# Patient Record
Sex: FEMALE | Race: WHITE | Employment: FULL TIME | URBAN - METROPOLITAN AREA
[De-identification: names, ages, dates, MRNs, and addresses within clinical notes are randomized per-mention and may not be internally consistent; named-entity substitution may affect disease eponyms.]

---

## 2017-10-04 ENCOUNTER — EMPLOYEE HEALTH (OUTPATIENT)
Dept: OCCUPATIONAL MEDICINE | Age: 42
End: 2017-10-04
Attending: PHYSICIAN ASSISTANT

## 2017-11-27 ENCOUNTER — OFFICE VISIT (OUTPATIENT)
Dept: FAMILY MEDICINE CLINIC | Facility: CLINIC | Age: 42
End: 2017-11-27

## 2017-11-27 ENCOUNTER — APPOINTMENT (OUTPATIENT)
Dept: LAB | Age: 42
End: 2017-11-27
Attending: PHYSICIAN ASSISTANT
Payer: COMMERCIAL

## 2017-11-27 VITALS
SYSTOLIC BLOOD PRESSURE: 102 MMHG | DIASTOLIC BLOOD PRESSURE: 70 MMHG | BODY MASS INDEX: 25.48 KG/M2 | HEIGHT: 58 IN | TEMPERATURE: 98 F | HEART RATE: 88 BPM | WEIGHT: 121.38 LBS | RESPIRATION RATE: 16 BRPM

## 2017-11-27 DIAGNOSIS — Z13.29 SCREENING FOR ENDOCRINE, METABOLIC AND IMMUNITY DISORDER: ICD-10-CM

## 2017-11-27 DIAGNOSIS — Z13.0 SCREENING FOR ENDOCRINE, METABOLIC AND IMMUNITY DISORDER: ICD-10-CM

## 2017-11-27 DIAGNOSIS — Z13.228 SCREENING FOR ENDOCRINE, METABOLIC AND IMMUNITY DISORDER: ICD-10-CM

## 2017-11-27 DIAGNOSIS — Z13.220 SCREENING FOR LIPOID DISORDERS: ICD-10-CM

## 2017-11-27 DIAGNOSIS — Z00.00 ROUTINE GENERAL MEDICAL EXAMINATION AT A HEALTH CARE FACILITY: Primary | ICD-10-CM

## 2017-11-27 PROCEDURE — 80061 LIPID PANEL: CPT

## 2017-11-27 PROCEDURE — 99386 PREV VISIT NEW AGE 40-64: CPT | Performed by: PHYSICIAN ASSISTANT

## 2017-11-27 PROCEDURE — 36415 COLL VENOUS BLD VENIPUNCTURE: CPT

## 2017-11-27 PROCEDURE — 80053 COMPREHEN METABOLIC PANEL: CPT

## 2017-11-27 RX ORDER — NORETHINDRONE ACETATE AND ETHINYL ESTRADIOL .03; 1.5 MG/1; MG/1
1 TABLET ORAL DAILY
COMMUNITY
End: 2020-09-01

## 2017-11-27 NOTE — PROGRESS NOTES
Simone Doyle is a 38year old female who presents for a complete physical exam/wellness screening. She has no complaints.   HPI:   Last colonoscopy:  None  Last PAP: Sees GYN  Last Mammogram: None; sees GYN  Last Tetanus:     Wt Readings from Last 6 Encounters: 102/70   Pulse 88   Temp 98.4 °F (36.9 °C) (Oral)   Resp 16   Ht 58\"   Wt 121 lb 6.4 oz   LMP  (LMP Unknown)   BMI 25.37 kg/m²   Body mass index is 25.37 kg/m². Vital signs reviewed.      General: Well developed, well nourished; in no apparent distress

## 2018-03-30 ENCOUNTER — HOSPITAL ENCOUNTER (OUTPATIENT)
Dept: MAMMOGRAPHY | Facility: HOSPITAL | Age: 43
Discharge: HOME | End: 2018-03-30
Attending: OBSTETRICS & GYNECOLOGY
Payer: COMMERCIAL

## 2018-03-30 DIAGNOSIS — Z12.31 ENCOUNTER FOR SCREENING MAMMOGRAM FOR MALIGNANT NEOPLASM OF BREAST: ICD-10-CM

## 2018-03-30 PROCEDURE — 77067 SCR MAMMO BI INCL CAD: CPT | Performed by: OBSTETRICS & GYNECOLOGY

## 2018-08-27 ENCOUNTER — OFFICE VISIT (OUTPATIENT)
Dept: FAMILY MEDICINE CLINIC | Facility: CLINIC | Age: 43
End: 2018-08-27
Payer: COMMERCIAL

## 2018-08-27 VITALS
WEIGHT: 121 LBS | TEMPERATURE: 98 F | HEIGHT: 58 IN | RESPIRATION RATE: 18 BRPM | BODY MASS INDEX: 25.4 KG/M2 | HEART RATE: 89 BPM | OXYGEN SATURATION: 99 % | DIASTOLIC BLOOD PRESSURE: 70 MMHG | SYSTOLIC BLOOD PRESSURE: 110 MMHG

## 2018-08-27 DIAGNOSIS — Z13.0 SCREENING FOR IRON DEFICIENCY ANEMIA: ICD-10-CM

## 2018-08-27 DIAGNOSIS — Z13.29 SCREENING FOR ENDOCRINE, NUTRITIONAL, METABOLIC AND IMMUNITY DISORDER: ICD-10-CM

## 2018-08-27 DIAGNOSIS — Z13.228 SCREENING FOR ENDOCRINE, NUTRITIONAL, METABOLIC AND IMMUNITY DISORDER: ICD-10-CM

## 2018-08-27 DIAGNOSIS — Z00.00 ANNUAL PHYSICAL EXAM: Primary | ICD-10-CM

## 2018-08-27 DIAGNOSIS — E78.1 HYPERTRIGLYCERIDEMIA: ICD-10-CM

## 2018-08-27 DIAGNOSIS — Z13.21 SCREENING FOR ENDOCRINE, NUTRITIONAL, METABOLIC AND IMMUNITY DISORDER: ICD-10-CM

## 2018-08-27 DIAGNOSIS — Z13.6 SCREENING FOR HEART DISEASE: ICD-10-CM

## 2018-08-27 DIAGNOSIS — Z13.0 SCREENING FOR ENDOCRINE, NUTRITIONAL, METABOLIC AND IMMUNITY DISORDER: ICD-10-CM

## 2018-08-27 LAB
ALBUMIN SERPL-MCNC: 3.9 G/DL (ref 3.5–4.8)
ALBUMIN/GLOB SERPL: 1.1 {RATIO} (ref 1–2)
ALP LIVER SERPL-CCNC: 46 U/L (ref 37–98)
ALT SERPL-CCNC: 20 U/L (ref 14–54)
ANION GAP SERPL CALC-SCNC: 9 MMOL/L (ref 0–18)
AST SERPL-CCNC: 20 U/L (ref 15–41)
BASOPHILS # BLD AUTO: 0.05 X10(3) UL (ref 0–0.1)
BASOPHILS NFR BLD AUTO: 0.8 %
BILIRUB SERPL-MCNC: 0.4 MG/DL (ref 0.1–2)
BUN BLD-MCNC: 11 MG/DL (ref 8–20)
BUN/CREAT SERPL: 12.1 (ref 10–20)
CALCIUM BLD-MCNC: 9.3 MG/DL (ref 8.3–10.3)
CHLORIDE SERPL-SCNC: 108 MMOL/L (ref 101–111)
CHOLEST SMN-MCNC: 199 MG/DL (ref ?–200)
CO2 SERPL-SCNC: 24 MMOL/L (ref 22–32)
CREAT BLD-MCNC: 0.91 MG/DL (ref 0.55–1.02)
EOSINOPHIL # BLD AUTO: 0.06 X10(3) UL (ref 0–0.3)
EOSINOPHIL NFR BLD AUTO: 0.9 %
ERYTHROCYTE [DISTWIDTH] IN BLOOD BY AUTOMATED COUNT: 12.1 % (ref 11.5–16)
GLOBULIN PLAS-MCNC: 3.6 G/DL (ref 2.5–4)
GLUCOSE BLD-MCNC: 87 MG/DL (ref 70–99)
HCT VFR BLD AUTO: 43.9 % (ref 34–50)
HDLC SERPL-MCNC: 43 MG/DL (ref 40–59)
HGB BLD-MCNC: 15.2 G/DL (ref 12–16)
IMMATURE GRANULOCYTE COUNT: 0.02 X10(3) UL (ref 0–1)
IMMATURE GRANULOCYTE RATIO %: 0.3 %
LDLC SERPL CALC-MCNC: 98 MG/DL (ref ?–100)
LYMPHOCYTES # BLD AUTO: 2.2 X10(3) UL (ref 0.9–4)
LYMPHOCYTES NFR BLD AUTO: 33.2 %
M PROTEIN MFR SERPL ELPH: 7.5 G/DL (ref 6.1–8.3)
MCH RBC QN AUTO: 31.3 PG (ref 27–33.2)
MCHC RBC AUTO-ENTMCNC: 34.6 G/DL (ref 31–37)
MCV RBC AUTO: 90.3 FL (ref 81–100)
MONOCYTES # BLD AUTO: 0.48 X10(3) UL (ref 0.1–1)
MONOCYTES NFR BLD AUTO: 7.2 %
NEUTROPHIL ABS PRELIM: 3.82 X10 (3) UL (ref 1.3–6.7)
NEUTROPHILS # BLD AUTO: 3.82 X10(3) UL (ref 1.3–6.7)
NEUTROPHILS NFR BLD AUTO: 57.6 %
NONHDLC SERPL-MCNC: 156 MG/DL (ref ?–130)
OSMOLALITY SERPL CALC.SUM OF ELEC: 291 MOSM/KG (ref 275–295)
PLATELET # BLD AUTO: 232 10(3)UL (ref 150–450)
POTASSIUM SERPL-SCNC: 3.8 MMOL/L (ref 3.6–5.1)
RBC # BLD AUTO: 4.86 X10(6)UL (ref 3.8–5.1)
RED CELL DISTRIBUTION WIDTH-SD: 40 FL (ref 35.1–46.3)
SODIUM SERPL-SCNC: 141 MMOL/L (ref 136–144)
TRIGL SERPL-MCNC: 292 MG/DL (ref 30–149)
TSI SER-ACNC: 2.7 MIU/ML (ref 0.35–5.5)
VLDLC SERPL CALC-MCNC: 58 MG/DL (ref 0–30)
WBC # BLD AUTO: 6.6 X10(3) UL (ref 4–13)

## 2018-08-27 PROCEDURE — 36415 COLL VENOUS BLD VENIPUNCTURE: CPT | Performed by: FAMILY MEDICINE

## 2018-08-27 PROCEDURE — 99386 PREV VISIT NEW AGE 40-64: CPT | Performed by: FAMILY MEDICINE

## 2018-08-27 PROCEDURE — 80050 GENERAL HEALTH PANEL: CPT | Performed by: FAMILY MEDICINE

## 2018-08-27 PROCEDURE — 80061 LIPID PANEL: CPT | Performed by: FAMILY MEDICINE

## 2018-08-27 NOTE — PROGRESS NOTES
REASON FOR VISIT:    Rory Estrada is a 41year old female who presents for an Annual Health Assessment. 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   - twins ages 15and 38year-old twins  menses: Regular-sees OB/GYN,  [facility] [address] Cut : No    Annoyed : No    Guilty : No    Eye Opener : No    Scoring  Total Score: 0     Depression Screening (PHQ-2/PHQ-9): Over the LAST 2 WEEKS   Little interest or pleasure in doing things (over the last two weeks)?: Not at all    Feeling down, depres Tuberculosis Screen if high risk No components found for: PPDINDURAT      Disease Monitoring:    SPECIFIC DISEASE MONITORING Internal Lab or Procedure External Lab or Procedure   Annual Monitoring of Persistent     Medications (ACE/ARB, digoxin, diuretics) Alcohol use: Yes           1.2 oz/week     Standard drinks or equivalent: 2 per week     Comment: 2 drinks a week    Occ: Works [employer] :        REVIEW OF SYSTEMS:   GENERAL: feels well otherwise  SKIN: denies any unusual skin lesions  EYES Massimo Cobian is a 41year old female who presents for an [address]. PLAN SUMMARY:   1. Annual physical exam  Healthy-weight/bmi normal  -Encourage healthy diet of whole food and avoid processed food and sugary drinks and sodas.   Die

## 2018-08-30 ENCOUNTER — TELEPHONE (OUTPATIENT)
Dept: FAMILY MEDICINE CLINIC | Facility: CLINIC | Age: 43
End: 2018-08-30

## 2018-08-30 NOTE — TELEPHONE ENCOUNTER
MADELINEOM to return call to the office.  Provided pt office phone [phone] along with office hours given.    ----- Message from Hector Angulo DO sent at 8/29/2018  3:54 PM CDT -----  Mychart notified:  Your labs are normal except your lipid panel is mildl

## 2018-09-12 ENCOUNTER — TELEPHONE (OUTPATIENT)
Dept: FAMILY MEDICINE CLINIC | Facility: CLINIC | Age: 43
End: 2018-09-12

## 2018-09-14 NOTE — TELEPHONE ENCOUNTER
Patient signed HIPAA medical records authorization form for the Facility identified below to disclose patient health information to Tae Mead / Provider Name: [id number] Address: [address] [place] [address]. [address].  [address]

## 2018-12-20 ENCOUNTER — OFFICE VISIT (OUTPATIENT)
Dept: FAMILY MEDICINE CLINIC | Facility: CLINIC | Age: 43
End: 2018-12-20
Payer: COMMERCIAL

## 2018-12-20 VITALS
RESPIRATION RATE: 22 BRPM | SYSTOLIC BLOOD PRESSURE: 108 MMHG | DIASTOLIC BLOOD PRESSURE: 70 MMHG | WEIGHT: 129.81 LBS | OXYGEN SATURATION: 99 % | BODY MASS INDEX: 27 KG/M2 | TEMPERATURE: 98 F | HEART RATE: 87 BPM

## 2018-12-20 DIAGNOSIS — R29.898 TRANSIENT RIGHT LEG WEAKNESS: ICD-10-CM

## 2018-12-20 DIAGNOSIS — J01.20 SUBACUTE ETHMOIDAL SINUSITIS: Primary | ICD-10-CM

## 2018-12-20 DIAGNOSIS — Z91.09 ENVIRONMENTAL ALLERGIES: ICD-10-CM

## 2018-12-20 DIAGNOSIS — E78.1 HYPERTRIGLYCERIDEMIA: ICD-10-CM

## 2018-12-20 DIAGNOSIS — R29.898 TRANSIENT WEAKNESS OF LEFT LEG: ICD-10-CM

## 2018-12-20 DIAGNOSIS — H65.21 RIGHT CHRONIC SEROUS OTITIS MEDIA: ICD-10-CM

## 2018-12-20 DIAGNOSIS — H05.821 EYE MUSCLE WEAKNESS, RIGHT: ICD-10-CM

## 2018-12-20 PROCEDURE — 99214 OFFICE O/P EST MOD 30 MIN: CPT | Performed by: FAMILY MEDICINE

## 2018-12-20 RX ORDER — METHYLPREDNISOLONE 4 MG/1
TABLET ORAL
Qty: 21 TABLET | Refills: 0 | Status: SHIPPED | OUTPATIENT
Start: 2018-12-20 | End: 2019-08-19 | Stop reason: ALTCHOICE

## 2018-12-20 RX ORDER — AZITHROMYCIN 250 MG/1
TABLET, FILM COATED ORAL
Qty: 6 TABLET | Refills: 0 | Status: SHIPPED | OUTPATIENT
Start: 2018-12-20 | End: 2019-08-19 | Stop reason: ALTCHOICE

## 2018-12-20 RX ORDER — FLUTICASONE PROPIONATE 50 MCG
1 SPRAY, SUSPENSION (ML) NASAL 2 TIMES DAILY
Qty: 1 BOTTLE | Refills: 3 | Status: SHIPPED | OUTPATIENT
Start: 2018-12-20 | End: 2019-10-16

## 2018-12-20 NOTE — PROGRESS NOTES
CHIEF COMPLAINT: Patient presents with:  Ear Problem: crackling; x1 week  Eye Problem: difficulty opening eyelid    HPI:     Elsa Marylou is a 41year old female presents for complains of for the past month ear crackling right greater than left, right Past Surgical History:   Procedure Laterality Date   • HERNIA SURGERY     • KNEE SURGERY      ACL repair   • PRIOR CLASSICAL   ;    • REMOVAL OF RADIAL STYLOID     • TONSILLECTOMY        Family History   Adopted: Yes   Fa nontender  Neck: supple. No adenopathy. Heart: RRR without S3 or S4 murmur. Clear S1S2  Lungs: clear to auscultation bilaterally. No rales, rhonchi or wheezes. Good inspiratory and expiratory effort. No costosternal retractions.   Abdomen: soft, nontender 08/27/2018 40.0    • Neutrophil Absolute Prel* 08/27/2018 3.82    • Neutrophil Absolute 08/27/2018 3.82    • Lymphocyte Absolute 08/27/2018 2.20    • Monocyte Absolute 08/27/2018 0.48    • Eosinophil Absolute 08/27/2018 0.06    • Basophil Absolute 08/27/20 4 MG Oral Tab; Dose as generic Medrol Dosepak Take as directed with food and water  Dispense: 21 tablet; Refill: 0    7.  Hypertriglyceridemia  Repeat labs in 3 mos  Working on dietary and lifestyle changes    Meds This Visit:  Requested Prescriptions     S

## 2018-12-20 NOTE — PATIENT INSTRUCTIONS
· Rinse nasal passages with Neti pot every morning.   Follow directions to positive properly clean  · Use Flonase daily and continue Claritin  · allergy home modifications discussed-shower nightly, keep windows closed, consider hepa filter for bedroom, keep likely for you to get sinusitis. Do your best to prevent sinusitis by preventing these problems. Do what you can to avoid getting colds and other infections. Stay away from things that cause allergies (allergens). Keep your sinuses as moist as you can.   Ti chemical smoke in workplace settings. · Use saltwater rinses. Date Last Reviewed: 10/1/2016  © 5065-2009 [employer]. [address] [facility], [address] [place] [address]. All rights reserved.  This information is not intended as a substitute for angelo medications to help relieve them. Date Last Reviewed: 10/1/2016  © 0441-9984 [employer] [address]. [facility]7 [facility] [place], [address] [place]. All rights reserved. This information is not intended as a substitute for professional medical care.  Always over-the-counter medicine as directed to control pain, unless another medicine was prescribed. If you have chronic liver or kidney disease or ever had a stomach ulcer or GI bleeding, talk with your doctor before using these medicines.  Aspirin should never

## 2018-12-26 ENCOUNTER — APPOINTMENT (OUTPATIENT)
Dept: CT IMAGING | Facility: HOSPITAL | Age: 43
End: 2018-12-26
Attending: EMERGENCY MEDICINE
Payer: COMMERCIAL

## 2018-12-26 ENCOUNTER — HOSPITAL ENCOUNTER (EMERGENCY)
Facility: HOSPITAL | Age: 43
Discharge: HOME | End: 2018-12-26
Attending: EMERGENCY MEDICINE
Payer: COMMERCIAL

## 2018-12-26 ENCOUNTER — APPOINTMENT (OUTPATIENT)
Dept: ULTRASOUND IMAGING | Facility: HOSPITAL | Age: 43
End: 2018-12-26
Attending: EMERGENCY MEDICINE
Payer: COMMERCIAL

## 2018-12-26 VITALS
OXYGEN SATURATION: 98 % | TEMPERATURE: 97 F | WEIGHT: 126 LBS | HEART RATE: 84 BPM | BODY MASS INDEX: 27.18 KG/M2 | SYSTOLIC BLOOD PRESSURE: 123 MMHG | DIASTOLIC BLOOD PRESSURE: 84 MMHG | RESPIRATION RATE: 16 BRPM | HEIGHT: 57 IN

## 2018-12-26 DIAGNOSIS — R10.13 ABDOMINAL PAIN, EPIGASTRIC: Primary | ICD-10-CM

## 2018-12-26 PROCEDURE — 99285 EMERGENCY DEPT VISIT HI MDM: CPT

## 2018-12-26 PROCEDURE — 96374 THER/PROPH/DIAG INJ IV PUSH: CPT

## 2018-12-26 PROCEDURE — 74176 CT ABD & PELVIS W/O CONTRAST: CPT | Performed by: EMERGENCY MEDICINE

## 2018-12-26 PROCEDURE — 76700 US EXAM ABDOM COMPLETE: CPT | Performed by: EMERGENCY MEDICINE

## 2018-12-26 PROCEDURE — 81025 URINE PREGNANCY TEST: CPT

## 2018-12-26 PROCEDURE — 80053 COMPREHEN METABOLIC PANEL: CPT | Performed by: EMERGENCY MEDICINE

## 2018-12-26 PROCEDURE — 99284 EMERGENCY DEPT VISIT MOD MDM: CPT

## 2018-12-26 PROCEDURE — 96361 HYDRATE IV INFUSION ADD-ON: CPT

## 2018-12-26 PROCEDURE — 83690 ASSAY OF LIPASE: CPT | Performed by: EMERGENCY MEDICINE

## 2018-12-26 PROCEDURE — 81001 URINALYSIS AUTO W/SCOPE: CPT | Performed by: EMERGENCY MEDICINE

## 2018-12-26 PROCEDURE — 85025 COMPLETE CBC W/AUTO DIFF WBC: CPT | Performed by: EMERGENCY MEDICINE

## 2018-12-26 RX ORDER — PANTOPRAZOLE SODIUM 40 MG/1
40 TABLET, DELAYED RELEASE ORAL DAILY
Qty: 21 TABLET | Refills: 0 | Status: SHIPPED | OUTPATIENT
Start: 2018-12-26 | End: 2020-09-01

## 2018-12-26 RX ORDER — SODIUM CHLORIDE 9 MG/ML
1000 INJECTION, SOLUTION INTRAVENOUS ONCE
Status: COMPLETED | OUTPATIENT
Start: 2018-12-26 | End: 2018-12-26

## 2018-12-26 RX ORDER — KETOROLAC TROMETHAMINE 30 MG/ML
30 INJECTION, SOLUTION INTRAMUSCULAR; INTRAVENOUS ONCE
Status: COMPLETED | OUTPATIENT
Start: 2018-12-26 | End: 2018-12-26

## 2018-12-26 RX ORDER — DICYCLOMINE HCL 20 MG
20 TABLET ORAL 4 TIMES DAILY PRN
Qty: 15 TABLET | Refills: 0 | Status: SHIPPED | OUTPATIENT
Start: 2018-12-26 | End: 2019-08-19 | Stop reason: ALTCHOICE

## 2018-12-26 NOTE — ED INITIAL ASSESSMENT (HPI)
Pt here with upper abd pain radiating to back x 2 days, +indigestion, denies vomiting or diarrhea, no fevers.

## 2018-12-26 NOTE — ED PROVIDER NOTES
Patient Seen in: [facility] Emergency Department    History   Patient presents with:  Abdomen/Flank Pain (GI/)  Back Pain (musculoskeletal)    Stated Complaint: abdominal pain-epigastric, back pain    HPI    The patient is a 53-year-old female who Resp 20   Ht 144.8 cm (4' 9\")   Wt 57.2 kg   SpO2 97%   BMI 27.27 kg/m²         Physical Exam  GENERAL: Well-developed, well-nourished female sitting up breathing easily in no apparent distress. Patient is nontoxic in appearance.   HEENT: Head is normoce following orders were created for panel order CBC WITH DIFFERENTIAL WITH PLATELET.   Procedure                               Abnormality         Status                     ---------                               -----------         ------ the emergency room stay. The patient states that she has had a nuclear medicine test done to look at her gallbladder within the last 10 years which is unremarkable.   Patient was instructed to go home and was given perception for Protonix and for Bentyl an

## 2019-01-02 ENCOUNTER — TELEPHONE (OUTPATIENT)
Dept: FAMILY MEDICINE CLINIC | Facility: CLINIC | Age: 44
End: 2019-01-02

## 2019-01-02 NOTE — TELEPHONE ENCOUNTER
LMOM to return call to the office. Provided pt office phone [phone] along with office hours given. Call FU ER epigastric pain 12/26/18- needs OV this week if pain continues. Please call.

## 2019-02-25 ENCOUNTER — APPOINTMENT (OUTPATIENT)
Dept: OTHER | Facility: HOSPITAL | Age: 44
End: 2019-02-25
Attending: PREVENTIVE MEDICINE
Payer: OTHER MISCELLANEOUS

## 2019-02-28 ENCOUNTER — APPOINTMENT (OUTPATIENT)
Dept: OTHER | Facility: HOSPITAL | Age: 44
End: 2019-02-28
Attending: PREVENTIVE MEDICINE
Payer: OTHER MISCELLANEOUS

## 2019-04-03 ENCOUNTER — TELEPHONE (OUTPATIENT)
Dept: OBGYN CLINIC | Facility: CLINIC | Age: 44
End: 2019-04-03

## 2019-04-03 NOTE — TELEPHONE ENCOUNTER
Received from [address] [place] [address] records. Put in  [employer] bin in [place]. Patient has appt in [facility] with Dr Prosper Samson on 5/1.

## 2019-08-19 ENCOUNTER — OFFICE VISIT (OUTPATIENT)
Dept: FAMILY MEDICINE CLINIC | Facility: CLINIC | Age: 44
End: 2019-08-19
Payer: COMMERCIAL

## 2019-08-19 VITALS
DIASTOLIC BLOOD PRESSURE: 66 MMHG | BODY MASS INDEX: 28.68 KG/M2 | RESPIRATION RATE: 18 BRPM | TEMPERATURE: 98 F | SYSTOLIC BLOOD PRESSURE: 112 MMHG | WEIGHT: 136.63 LBS | HEIGHT: 58 IN | HEART RATE: 88 BPM | OXYGEN SATURATION: 98 %

## 2019-08-19 DIAGNOSIS — Z13.21 SCREENING FOR ENDOCRINE, NUTRITIONAL, METABOLIC AND IMMUNITY DISORDER: ICD-10-CM

## 2019-08-19 DIAGNOSIS — Z13.0 SCREENING FOR ENDOCRINE, NUTRITIONAL, METABOLIC AND IMMUNITY DISORDER: ICD-10-CM

## 2019-08-19 DIAGNOSIS — Z13.0 SCREENING FOR IRON DEFICIENCY ANEMIA: ICD-10-CM

## 2019-08-19 DIAGNOSIS — Z12.39 SCREENING FOR MALIGNANT NEOPLASM OF BREAST: ICD-10-CM

## 2019-08-19 DIAGNOSIS — Z00.00 ANNUAL PHYSICAL EXAM: ICD-10-CM

## 2019-08-19 DIAGNOSIS — Z13.29 SCREENING FOR ENDOCRINE, NUTRITIONAL, METABOLIC AND IMMUNITY DISORDER: ICD-10-CM

## 2019-08-19 DIAGNOSIS — E78.1 HYPERTRIGLYCERIDEMIA: Primary | ICD-10-CM

## 2019-08-19 DIAGNOSIS — Z13.228 SCREENING FOR ENDOCRINE, NUTRITIONAL, METABOLIC AND IMMUNITY DISORDER: ICD-10-CM

## 2019-08-19 PROCEDURE — 99396 PREV VISIT EST AGE 40-64: CPT | Performed by: FAMILY MEDICINE

## 2019-08-19 RX ORDER — LORATADINE 10 MG/1
10 TABLET ORAL DAILY
COMMUNITY

## 2019-08-19 NOTE — PATIENT INSTRUCTIONS
As of October 6th 2014, the Drug Enforcement Agency [facility]) is reclassifying all hydrocodone combination medications from Schedule III to Schedule II. This includes medications such as Norco, Vicodin, Lortab, Zohydro, and Vicoprofen.      What this means for exam.    -Encourage healthy diet of whole food and avoid processed food and sugary drinks and sodas. Diet should include lean meats and vegetables including 5-7 servings of fruit and vegetables total in 1 day.   Never skip breakfast.  -Encouraged exercise effectively. Screening tests are not used to diagnose a disease. But they are used to see if more testing is needed. Health counseling is important, too. Below are guidelines for these, for women ages 36 to 52.  Talk with your healthcare provider to make burks colonoscopy. Screening advice varies among expert groups. Talk with your healthcare provider about which tests are best for you. Some people should be screened using a different schedule because of their personal or family health history.  Talk with your h months after the second dose and at least 4 months after the first dose   Haemophilus influenzae Type B (HIB) Women at increased risk 1 to 3 doses   Influenza (flu) All women in this age group Once a year   Measles, mumps, rubella (MMR) All women in this a a friend. When activity is fun, you're more likely to stick with it. You may wonder how you can improve the health of your heart. If you’re thinking about exercise, you’re on the right track.  You don’t need to become an athlete, but you do need a certain can do. You can also try swimming, riding a bike, dancing, or taking an exercise class.   Stop exercising and call your doctor if you:  · Have chest pain or feel dizzy or lightheaded  · Feel burning, tightness, pressure, or heaviness in your chest, neck, sh day (the amount in 1 teaspoon of salt), or less if your healthcare provider recommends it. Dining out less often and eating fewer processed foods are two great ways to decrease the amount of salt you consume. · Managing calories.  A calorie is a unit of en cholesterol, and sodium. Avoid products that contain trans fat. And pay close attention to serving size. For instance, if you plan to eat two servings, double all the numbers on the label.   Prepare food right  A key part of healthy cooking is cutting down keep your bones at their peak mass, be sure to exercise and get plenty of calcium. After menopause  Menopause is when a woman stops having monthly periods. After menopause, the body makes less estrogen (female hormone). This increases bone loss.  At this p fortified   215 mg/1 cup   Nonfat milk   302 mg/1 cup   Sedro Woolley, sockeye, canned, with bones   239 mg/3 oz. Tofu made with calcium sulfate   204 mg/3 oz.    Low-fat milk   297 mg/1 cup   Soybeans, fresh, boiled   131 mg/1/2 cup   Collards   179 mg/1/2 cup called 25-hydroxyvitamin D, also called 25(OH)D. Why do I need this test?  You may need this test if your healthcare provider wants to check your vitamin D levels to find out if you have any risks to bone health.  These might be:  · Low calcium  · Soft petey you're taking too much in supplement form. How is this test done? The test is done with a blood sample. A needle is used to draw blood from a vein in your arm or hand. Does this test pose any risks?   Having a blood test with a needle carries some risk time, thin bones may break. Women who aren't active are at a high risk for osteoporosis. · Certain medicines. Some medicines, such as cortisone, increase bone loss. They also decrease bone growth.  Ask your healthcare provider about any side effects of you exercise program.  · Use weights only as instructed. · Stop any exercise that causes pain. Date Last Reviewed: 5/1/2018  © 7626-1237 The [employer] [address]. 1407 [facility], [address] [place] [address]. All rights reserved.  This information is not inten

## 2019-08-19 NOTE — PROGRESS NOTES
REASON FOR VISIT:    Luda Amato is a 41year old female who presents for an Annual Health Assessment. 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     No results found for: GLUCOSE  Lab Results   Component Value Date    CHOLEST 199 08/27/2018    CHOLEST Pap Every 3 yrs age 21-65 or Pap and HPV every 5 yrs age 33-67 Pap Smear,2 Years due on 02/10/2020    Chlamydia Screening Screen Annually age<25, if sex active/on OCPs; >24 high risk No results found for: CHLAMYDIA    Colonoscopy Screen Every 10 years Ther HgbA1C  Annually No results found for: A1C No flowsheet data found. Creat/alb ratio  Annually Creatinine (mg/dL)   Date Value   12/26/2018 0.87        LDL  Annually LDL Cholesterol (mg/dL)   Date Value   08/27/2018 98    No flowsheet data found.      Oz Mao EYES: denies blurred vision or double vision  HEENT: denies nasal congestion, sinus pain or ST  LUNGS: denies shortness of breath with exertion  CARDIOVASCULAR: denies chest pain on exertion  GI: denies abdominal pain, denies heartburn  : denies dysuria, -Encourage healthy diet of whole food and avoid processed food and sugary drinks and sodas. Diet should include lean meats and vegetables including 5-7 servings of fruit and vegetables total in 1 day.   Never skip breakfast.  -Encouraged exercise 30 minute

## 2019-09-04 ENCOUNTER — APPOINTMENT (OUTPATIENT)
Dept: CT IMAGING | Age: 44
End: 2019-09-04
Attending: PHYSICIAN ASSISTANT
Payer: COMMERCIAL

## 2019-09-04 ENCOUNTER — HOSPITAL ENCOUNTER (OUTPATIENT)
Age: 44
Discharge: HOME | End: 2019-09-04
Payer: COMMERCIAL

## 2019-09-04 VITALS
BODY MASS INDEX: 29.12 KG/M2 | HEIGHT: 57 IN | WEIGHT: 135 LBS | TEMPERATURE: 98 F | OXYGEN SATURATION: 98 % | SYSTOLIC BLOOD PRESSURE: 120 MMHG | DIASTOLIC BLOOD PRESSURE: 80 MMHG | RESPIRATION RATE: 16 BRPM | HEART RATE: 79 BPM

## 2019-09-04 DIAGNOSIS — G43.009 MIGRAINE WITHOUT AURA AND WITHOUT STATUS MIGRAINOSUS, NOT INTRACTABLE: Primary | ICD-10-CM

## 2019-09-04 PROCEDURE — 96375 TX/PRO/DX INJ NEW DRUG ADDON: CPT

## 2019-09-04 PROCEDURE — 99215 OFFICE O/P EST HI 40 MIN: CPT

## 2019-09-04 PROCEDURE — 96374 THER/PROPH/DIAG INJ IV PUSH: CPT

## 2019-09-04 PROCEDURE — 70450 CT HEAD/BRAIN W/O DYE: CPT | Performed by: PHYSICIAN ASSISTANT

## 2019-09-04 PROCEDURE — 96361 HYDRATE IV INFUSION ADD-ON: CPT

## 2019-09-04 PROCEDURE — 99214 OFFICE O/P EST MOD 30 MIN: CPT

## 2019-09-04 RX ORDER — KETOROLAC TROMETHAMINE 30 MG/ML
30 INJECTION, SOLUTION INTRAMUSCULAR; INTRAVENOUS ONCE
Status: COMPLETED | OUTPATIENT
Start: 2019-09-04 | End: 2019-09-04

## 2019-09-04 RX ORDER — ACETAMINOPHEN 325 MG/1
650 TABLET ORAL ONCE
Status: COMPLETED | OUTPATIENT
Start: 2019-09-04 | End: 2019-09-04

## 2019-09-04 RX ORDER — METOCLOPRAMIDE HYDROCHLORIDE 5 MG/ML
10 INJECTION INTRAMUSCULAR; INTRAVENOUS ONCE
Status: COMPLETED | OUTPATIENT
Start: 2019-09-04 | End: 2019-09-04

## 2019-09-04 RX ORDER — SODIUM CHLORIDE 9 MG/ML
1000 INJECTION, SOLUTION INTRAVENOUS ONCE
Status: COMPLETED | OUTPATIENT
Start: 2019-09-04 | End: 2019-09-04

## 2019-09-04 NOTE — ED PROVIDER NOTES
Patient Seen in: [facility]    History   Patient presents with:  Headache (neurologic)    Stated Complaint: migraine     HPI    Jaclyncasie Sanford is a 44-year-old female with a history of migraines who states that over the past 48 hours s reviewed. All other systems reviewed and negative except as noted above.     Physical Exam     ED Triage Vitals [09/04/19 1019]   /78   Pulse 96   Resp 16   Temp 98.1 °F (36.7 °C)   Temp src    SpO2 98 %   O2 Device None (Room air)       Current: neurology follow-up. She verbalizes understanding she states that her and her physician talked about this at the last visit and she failed to schedule a appointment with neurology. Will patient was given an IV fluid bolus along with Toradol and Reglan. no

## 2019-09-04 NOTE — ED INITIAL ASSESSMENT (HPI)
developed right sided headache on Monday 10/10. Took usual excedrin migraine every 8 hrs without relief. Home from work yesterday, unable to tolerate work today.  has seasonal allergies.   Taking birth control for \"hormone migraines\"  Has

## 2019-09-06 ENCOUNTER — PATIENT OUTREACH (OUTPATIENT)
Dept: FAMILY MEDICINE CLINIC | Facility: CLINIC | Age: 44
End: 2019-09-06

## 2019-09-06 NOTE — PROGRESS NOTES
Patient was seen in the  IC on 9/4/19 for migraine. Left patient message to call office to schedule follow up appointment.

## 2019-10-04 ENCOUNTER — LAB ENCOUNTER (OUTPATIENT)
Dept: LAB | Facility: HOSPITAL | Age: 44
End: 2019-10-04
Attending: FAMILY MEDICINE
Payer: COMMERCIAL

## 2019-10-04 DIAGNOSIS — Z13.0 SCREENING FOR IRON DEFICIENCY ANEMIA: ICD-10-CM

## 2019-10-04 DIAGNOSIS — Z13.228 SCREENING FOR ENDOCRINE, NUTRITIONAL, METABOLIC AND IMMUNITY DISORDER: ICD-10-CM

## 2019-10-04 DIAGNOSIS — Z13.0 SCREENING FOR ENDOCRINE, NUTRITIONAL, METABOLIC AND IMMUNITY DISORDER: ICD-10-CM

## 2019-10-04 DIAGNOSIS — E78.1 HYPERTRIGLYCERIDEMIA: ICD-10-CM

## 2019-10-04 DIAGNOSIS — Z13.29 SCREENING FOR ENDOCRINE, NUTRITIONAL, METABOLIC AND IMMUNITY DISORDER: ICD-10-CM

## 2019-10-04 DIAGNOSIS — Z13.21 SCREENING FOR ENDOCRINE, NUTRITIONAL, METABOLIC AND IMMUNITY DISORDER: ICD-10-CM

## 2019-10-04 DIAGNOSIS — Z00.00 ANNUAL PHYSICAL EXAM: ICD-10-CM

## 2019-10-04 PROCEDURE — 80053 COMPREHEN METABOLIC PANEL: CPT

## 2019-10-04 PROCEDURE — 36415 COLL VENOUS BLD VENIPUNCTURE: CPT

## 2019-10-04 PROCEDURE — 84443 ASSAY THYROID STIM HORMONE: CPT

## 2019-10-04 PROCEDURE — 80061 LIPID PANEL: CPT

## 2019-10-04 PROCEDURE — 85025 COMPLETE CBC W/AUTO DIFF WBC: CPT

## 2019-10-08 ENCOUNTER — TELEPHONE (OUTPATIENT)
Dept: FAMILY MEDICINE CLINIC | Facility: CLINIC | Age: 44
End: 2019-10-08

## 2020-03-27 ENCOUNTER — MED REC SCAN ONLY (OUTPATIENT)
Dept: OBGYN CLINIC | Facility: CLINIC | Age: 45
End: 2020-03-27

## 2020-06-23 ENCOUNTER — OFFICE VISIT (OUTPATIENT)
Dept: ORTHOPEDICS CLINIC | Facility: CLINIC | Age: 45
End: 2020-06-23
Payer: COMMERCIAL

## 2020-06-23 ENCOUNTER — HOSPITAL ENCOUNTER (OUTPATIENT)
Dept: GENERAL RADIOLOGY | Facility: HOSPITAL | Age: 45
Discharge: HOME | End: 2020-06-23
Attending: ORTHOPAEDIC SURGERY
Payer: COMMERCIAL

## 2020-06-23 VITALS — HEART RATE: 84 BPM | SYSTOLIC BLOOD PRESSURE: 127 MMHG | DIASTOLIC BLOOD PRESSURE: 87 MMHG

## 2020-06-23 DIAGNOSIS — Z47.89 ORTHOPEDIC AFTERCARE: ICD-10-CM

## 2020-06-23 DIAGNOSIS — M77.11 RIGHT LATERAL EPICONDYLITIS: Primary | ICD-10-CM

## 2020-06-23 PROCEDURE — 20551 NJX 1 TENDON ORIGIN/INSJ: CPT | Performed by: ORTHOPAEDIC SURGERY

## 2020-06-23 PROCEDURE — 73070 X-RAY EXAM OF ELBOW: CPT | Performed by: ORTHOPAEDIC SURGERY

## 2020-06-23 PROCEDURE — 99203 OFFICE O/P NEW LOW 30 MIN: CPT | Performed by: ORTHOPAEDIC SURGERY

## 2020-06-23 RX ORDER — METHYLPREDNISOLONE ACETATE 40 MG/ML
40 INJECTION, SUSPENSION INTRA-ARTICULAR; INTRALESIONAL; INTRAMUSCULAR; SOFT TISSUE ONCE
Status: DISCONTINUED | OUTPATIENT
Start: 2020-06-23 | End: 2020-06-23

## 2020-06-23 RX ORDER — TRIAMCINOLONE ACETONIDE 40 MG/ML
40 INJECTION, SUSPENSION INTRA-ARTICULAR; INTRAMUSCULAR ONCE
Status: COMPLETED | OUTPATIENT
Start: 2020-06-23 | End: 2020-06-23

## 2020-06-23 NOTE — PROGRESS NOTES
Per verbal order from Dr. Jake Mujica, draw up 1ml of 1% lidocaine and 1ml of Kenalog 40 for cortisone injection to right elbow Clearance Monica LEDBETTER LPN    Pt left office before I could obtain post injection vitals.

## 2020-08-05 ENCOUNTER — PATIENT MESSAGE (OUTPATIENT)
Dept: FAMILY MEDICINE CLINIC | Facility: CLINIC | Age: 45
End: 2020-08-05

## 2020-08-05 NOTE — TELEPHONE ENCOUNTER
From: Erik Foster  To: Ju Fuentes DO  Sent: 2020 10:28 AM CDT  Subject: Non-Urgent Medical Question    I can't see my son on my chart. How do I get Sonia Schilling ( 10/28/2007) medicine (inhaler) form for school.  They normally send stuff as

## 2020-09-01 ENCOUNTER — OFFICE VISIT (OUTPATIENT)
Dept: OBGYN CLINIC | Facility: CLINIC | Age: 45
End: 2020-09-01
Payer: COMMERCIAL

## 2020-09-01 VITALS
HEIGHT: 58 IN | SYSTOLIC BLOOD PRESSURE: 110 MMHG | WEIGHT: 131.63 LBS | HEART RATE: 87 BPM | DIASTOLIC BLOOD PRESSURE: 70 MMHG | BODY MASS INDEX: 27.63 KG/M2

## 2020-09-01 DIAGNOSIS — N76.1 CHRONIC VAGINITIS: ICD-10-CM

## 2020-09-01 DIAGNOSIS — G43.829 MENSTRUAL MIGRAINE WITHOUT STATUS MIGRAINOSUS, NOT INTRACTABLE: ICD-10-CM

## 2020-09-01 DIAGNOSIS — Z11.3 SCREEN FOR STD (SEXUALLY TRANSMITTED DISEASE): ICD-10-CM

## 2020-09-01 DIAGNOSIS — Z12.31 ENCOUNTER FOR SCREENING MAMMOGRAM FOR MALIGNANT NEOPLASM OF BREAST: ICD-10-CM

## 2020-09-01 DIAGNOSIS — Z01.419 WELL WOMAN EXAM WITH ROUTINE GYNECOLOGICAL EXAM: Primary | ICD-10-CM

## 2020-09-01 DIAGNOSIS — Z30.09 GENERAL COUNSELING FOR PRESCRIPTION OF ORAL CONTRACEPTIVES: ICD-10-CM

## 2020-09-01 PROCEDURE — 87491 CHLMYD TRACH DNA AMP PROBE: CPT | Performed by: OBSTETRICS & GYNECOLOGY

## 2020-09-01 PROCEDURE — 87510 GARDNER VAG DNA DIR PROBE: CPT | Performed by: OBSTETRICS & GYNECOLOGY

## 2020-09-01 PROCEDURE — 87624 HPV HI-RISK TYP POOLED RSLT: CPT | Performed by: OBSTETRICS & GYNECOLOGY

## 2020-09-01 PROCEDURE — 3074F SYST BP LT 130 MM HG: CPT | Performed by: OBSTETRICS & GYNECOLOGY

## 2020-09-01 PROCEDURE — 3008F BODY MASS INDEX DOCD: CPT | Performed by: OBSTETRICS & GYNECOLOGY

## 2020-09-01 PROCEDURE — 99386 PREV VISIT NEW AGE 40-64: CPT | Performed by: OBSTETRICS & GYNECOLOGY

## 2020-09-01 PROCEDURE — 88175 CYTOPATH C/V AUTO FLUID REDO: CPT | Performed by: OBSTETRICS & GYNECOLOGY

## 2020-09-01 PROCEDURE — 87591 N.GONORRHOEAE DNA AMP PROB: CPT | Performed by: OBSTETRICS & GYNECOLOGY

## 2020-09-01 PROCEDURE — 87660 TRICHOMONAS VAGIN DIR PROBE: CPT | Performed by: OBSTETRICS & GYNECOLOGY

## 2020-09-01 PROCEDURE — 3078F DIAST BP <80 MM HG: CPT | Performed by: OBSTETRICS & GYNECOLOGY

## 2020-09-01 PROCEDURE — 87625 HPV TYPES 16 & 18 ONLY: CPT | Performed by: OBSTETRICS & GYNECOLOGY

## 2020-09-01 PROCEDURE — 87480 CANDIDA DNA DIR PROBE: CPT | Performed by: OBSTETRICS & GYNECOLOGY

## 2020-09-01 RX ORDER — RIMEGEPANT SULFATE 75 MG/75MG
75 TABLET, ORALLY DISINTEGRATING ORAL AS NEEDED
COMMUNITY
Start: 2020-08-15 | End: 2020-09-11

## 2020-09-01 NOTE — H&P
[facility]  Obstetrics and Gynecology   History & Physical  NEW    Chief complaint: Patient presents with:  Wellness Visit: on birth control, wants to discuss other options   Other: continuous OCP to prevent migraines.  Having vaginal dryness & is Jennifer Armenta DO    Review of Systems   Constitutional: Negative. Respiratory: Negative. Cardiovascular: Negative. Gastrointestinal: Negative. Genitourinary: Positive for sexual dysfunction.  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        Bike Helmet: Not Asked        Seat Belt: Yes        Self-Exams: No    Social History Narrative      Not on file       Family History:  Family History   Adopted: Yes   Family history unknown: Yes       Objective:      09/01/20  1046 10/04/2019    MCH 31.8 10/04/2019    MCHC 36.3 10/04/2019    RDW 11.9 10/04/2019    .0 10/04/2019        Lab Results   Component Value Date    GLU 80 10/04/2019    BUN 11 10/04/2019    BUNCREA 12.4 10/04/2019    CREATSERUM 0.89 10/04/2019    ANIONGA Future    Screen for STD (sexually transmitted disease)  -     VAGINITIS/VAGINOSIS, DNA PROBE; Future  -     CHLAMYDIA/GONOCOCCUS, LUCAS; Future    Menstrual migraine without status migrainosus, not intractable  -     Drospirenone (SLYND) 4 MG Oral Tab;  Take

## 2020-09-03 LAB
C TRACH DNA SPEC QL NAA+PROBE: NEGATIVE
HPV I/H RISK 1 DNA SPEC QL NAA+PROBE: POSITIVE
HPV16 DNA CVX QL PROBE+SIG AMP: NEGATIVE
HPV18 DNA CVX QL PROBE+SIG AMP: NEGATIVE
N GONORRHOEA DNA SPEC QL NAA+PROBE: NEGATIVE

## 2020-09-03 NOTE — PROGRESS NOTES
Please notify patient of negative (normal) gonorrhea & chlamydia test. Positive for HPV. Await strain typing & pap.

## 2020-09-11 ENCOUNTER — OFFICE VISIT (OUTPATIENT)
Dept: FAMILY MEDICINE CLINIC | Facility: CLINIC | Age: 45
End: 2020-09-11
Payer: COMMERCIAL

## 2020-09-11 VITALS
TEMPERATURE: 98 F | DIASTOLIC BLOOD PRESSURE: 66 MMHG | HEART RATE: 108 BPM | SYSTOLIC BLOOD PRESSURE: 100 MMHG | RESPIRATION RATE: 18 BRPM | OXYGEN SATURATION: 98 % | WEIGHT: 127.63 LBS | HEIGHT: 57.75 IN | BODY MASS INDEX: 26.79 KG/M2

## 2020-09-11 DIAGNOSIS — E78.2 MIXED HYPERLIPIDEMIA: ICD-10-CM

## 2020-09-11 DIAGNOSIS — R87.610 ASCUS OF CERVIX WITH NEGATIVE HIGH RISK HPV: ICD-10-CM

## 2020-09-11 DIAGNOSIS — G43.809 OTHER MIGRAINE WITHOUT STATUS MIGRAINOSUS, NOT INTRACTABLE: ICD-10-CM

## 2020-09-11 DIAGNOSIS — Z00.00 ANNUAL PHYSICAL EXAM: Primary | ICD-10-CM

## 2020-09-11 PROBLEM — J01.20 SUBACUTE ETHMOIDAL SINUSITIS: Status: RESOLVED | Noted: 2018-12-20 | Resolved: 2020-09-11

## 2020-09-11 PROBLEM — E78.1 HYPERTRIGLYCERIDEMIA: Status: RESOLVED | Noted: 2018-08-27 | Resolved: 2020-09-11

## 2020-09-11 LAB
ALBUMIN SERPL-MCNC: 4.6 G/DL (ref 3.4–5)
ALBUMIN/GLOB SERPL: 1.5 {RATIO} (ref 1–2)
ALP LIVER SERPL-CCNC: 55 U/L (ref 37–98)
ALT SERPL-CCNC: 21 U/L (ref 13–56)
ANION GAP SERPL CALC-SCNC: 4 MMOL/L (ref 0–18)
AST SERPL-CCNC: 20 U/L (ref 15–37)
BASOPHILS # BLD AUTO: 0.06 X10(3) UL (ref 0–0.2)
BASOPHILS NFR BLD AUTO: 1.1 %
BILIRUB SERPL-MCNC: 0.6 MG/DL (ref 0.1–2)
BUN BLD-MCNC: 19 MG/DL (ref 7–18)
BUN/CREAT SERPL: 17.9 (ref 10–20)
CALCIUM BLD-MCNC: 9.8 MG/DL (ref 8.5–10.1)
CHLORIDE SERPL-SCNC: 106 MMOL/L (ref 98–112)
CHOLEST SMN-MCNC: 191 MG/DL (ref ?–200)
CO2 SERPL-SCNC: 28 MMOL/L (ref 21–32)
CREAT BLD-MCNC: 1.06 MG/DL (ref 0.55–1.02)
DEPRECATED RDW RBC AUTO: 38.2 FL (ref 35.1–46.3)
EOSINOPHIL # BLD AUTO: 0.09 X10(3) UL (ref 0–0.7)
EOSINOPHIL NFR BLD AUTO: 1.6 %
ERYTHROCYTE [DISTWIDTH] IN BLOOD BY AUTOMATED COUNT: 11.5 % (ref 11–15)
EST. AVERAGE GLUCOSE BLD GHB EST-MCNC: 88 MG/DL (ref 68–126)
GLOBULIN PLAS-MCNC: 3 G/DL (ref 2.8–4.4)
GLUCOSE BLD-MCNC: 93 MG/DL (ref 70–99)
HBA1C MFR BLD HPLC: 4.7 % (ref ?–5.7)
HCT VFR BLD AUTO: 44.6 % (ref 35–48)
HDLC SERPL-MCNC: 46 MG/DL (ref 40–59)
HGB BLD-MCNC: 15.6 G/DL (ref 12–16)
IMM GRANULOCYTES # BLD AUTO: 0.01 X10(3) UL (ref 0–1)
IMM GRANULOCYTES NFR BLD: 0.2 %
LDLC SERPL CALC-MCNC: 115 MG/DL (ref ?–100)
LYMPHOCYTES # BLD AUTO: 2.2 X10(3) UL (ref 1–4)
LYMPHOCYTES NFR BLD AUTO: 39.1 %
M PROTEIN MFR SERPL ELPH: 7.6 G/DL (ref 6.4–8.2)
MCH RBC QN AUTO: 31.7 PG (ref 26–34)
MCHC RBC AUTO-ENTMCNC: 35 G/DL (ref 31–37)
MCV RBC AUTO: 90.7 FL (ref 80–100)
MONOCYTES # BLD AUTO: 0.55 X10(3) UL (ref 0.1–1)
MONOCYTES NFR BLD AUTO: 9.8 %
NEUTROPHILS # BLD AUTO: 2.72 X10 (3) UL (ref 1.5–7.7)
NEUTROPHILS # BLD AUTO: 2.72 X10(3) UL (ref 1.5–7.7)
NEUTROPHILS NFR BLD AUTO: 48.2 %
NONHDLC SERPL-MCNC: 145 MG/DL (ref ?–130)
OSMOLALITY SERPL CALC.SUM OF ELEC: 288 MOSM/KG (ref 275–295)
PATIENT FASTING Y/N/NP: YES
PATIENT FASTING Y/N/NP: YES
PLATELET # BLD AUTO: 207 10(3)UL (ref 150–450)
POTASSIUM SERPL-SCNC: 4.4 MMOL/L (ref 3.5–5.1)
RBC # BLD AUTO: 4.92 X10(6)UL (ref 3.8–5.3)
SODIUM SERPL-SCNC: 138 MMOL/L (ref 136–145)
TRIGL SERPL-MCNC: 149 MG/DL (ref 30–149)
TSI SER-ACNC: 1.81 MIU/ML (ref 0.36–3.74)
VLDLC SERPL CALC-MCNC: 30 MG/DL (ref 0–30)
WBC # BLD AUTO: 5.6 X10(3) UL (ref 4–11)

## 2020-09-11 PROCEDURE — 3078F DIAST BP <80 MM HG: CPT | Performed by: FAMILY MEDICINE

## 2020-09-11 PROCEDURE — 83036 HEMOGLOBIN GLYCOSYLATED A1C: CPT | Performed by: FAMILY MEDICINE

## 2020-09-11 PROCEDURE — 85025 COMPLETE CBC W/AUTO DIFF WBC: CPT | Performed by: FAMILY MEDICINE

## 2020-09-11 PROCEDURE — 3008F BODY MASS INDEX DOCD: CPT | Performed by: FAMILY MEDICINE

## 2020-09-11 PROCEDURE — 84443 ASSAY THYROID STIM HORMONE: CPT | Performed by: FAMILY MEDICINE

## 2020-09-11 PROCEDURE — 36415 COLL VENOUS BLD VENIPUNCTURE: CPT | Performed by: FAMILY MEDICINE

## 2020-09-11 PROCEDURE — 3074F SYST BP LT 130 MM HG: CPT | Performed by: FAMILY MEDICINE

## 2020-09-11 PROCEDURE — 99396 PREV VISIT EST AGE 40-64: CPT | Performed by: FAMILY MEDICINE

## 2020-09-11 PROCEDURE — 80061 LIPID PANEL: CPT | Performed by: FAMILY MEDICINE

## 2020-09-11 PROCEDURE — 80053 COMPREHEN METABOLIC PANEL: CPT | Performed by: FAMILY MEDICINE

## 2020-09-11 RX ORDER — RIMEGEPANT SULFATE 75 MG/75MG
75 TABLET, ORALLY DISINTEGRATING ORAL AS NEEDED
Qty: 9 TABLET | Refills: 3 | Status: SHIPPED | OUTPATIENT
Start: 2020-09-11 | End: 2021-02-18

## 2020-09-11 NOTE — PATIENT INSTRUCTIONS
As of October 6th 2014, the Drug Enforcement Agency [facility]) is reclassifying all hydrocodone combination medications from Schedule III to Schedule II. This includes medications such as Norco, Vicodin, Lortab, Zohydro, and Vicoprofen.      What this means for to exam.    -Encourage healthy diet of whole food and avoid processed food and sugary drinks and sodas. Diet should include lean meats and vegetables including 5-7 servings of fruit and vegetables total in 1 day.   Never skip breakfast.  -Encouraged exerci exercise? Exercising regularly offers many healthy rewards.  It can help you do all of the following:  · Improve your blood cholesterol level to help prevent further heart trouble  · Lower your blood pressure to help prevent a stroke or heart attack  · Con substitute for professional medical care. Always follow your healthcare professional's instructions. Eating Heart-Healthy Foods  Eating has a big impact on your heart health. In fact, eating healthier can improve several of your heart risks at once.  Gagan Johnson keep a diary to record what you eat and how often you exercise. Choose the right foods  Aim to make these foods staples of your diet.  If you have diabetes, you may have different recommendations than what is listed here:  · Fruits and vegetables provide p spice up your food without adding calories, fat, or sodium. Try these items: horseradish, hot sauce, lemon, mustard, nonfat salad dressings, and vinegar. For salt-free herbs and spices, try basil, cilantro, cinnamon, pepper, and rosemary.   Date Last Review increased risk for fractures. With age, the quality and quantity of bone declines. You can lessen bone loss by staying active and increasing your calcium intake. Calcium supplements and other osteoporosis treatments do have risks.  So talk with your healthc may vary depending on brand and size.   Daily calcium needs  15to 25years old: 1,300 mg  23to 27years old: 1,000 mg  32to 48years old: 1,000 mg  46to 79years old, women: 1,200 mg  46to 79years old, men: 1,000 mg  Pregnant or nursin to 18 year disease  · Have dark skin pigmentation  · Being a  baby  Vitamin D has many effects in the body.  You may need this test to help your healthcare provider diagnose or treat:  · Problems with the parathyroid gland  · Cancer  · Autoimmune diseases, burks vitamin D in supplement form can affect your vitamin D levels. Ask your healthcare provider if any health conditions you have or medicines you take could affect your results.   How do I get ready for this test?  Tell your healthcare provider if you take vit [employer]. [address] [facility] [place], [address] [place] [address]. All rights reserved. This information is not intended as a substitute for professional medical care. Always follow your healthcare professional's instructions.           Living with Tony Kidd

## 2020-09-11 NOTE — PROGRESS NOTES
REASON FOR VISIT:    Leeann Valiente is a 39year old female who presents for an [address]. 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   - twins ages 15 and tablet 3   • Fluticasone Propionate 50 MCG/ACT Nasal Suspension 1 spray by Nasal route 2 (two) times daily. 1 Bottle 3   • loratadine 10 MG Oral Tab Take 10 mg by mouth daily.        Wt Readings from Last 6 Encounters:  09/11/20 : 127 lb 9.6 oz (57.9 kg)  0 Screening (PHQ-2/PHQ-9): Over the LAST 2 WEEKS   Little interest or pleasure in doing things (over the last two weeks)?: Not at all    Feeling down, depressed, or hopeless (over the last two weeks)?: Not at all    PHQ-2 SCORE: 0        PREVENTATIVE SERVICE Procedure External Lab or Procedure   Annual Monitoring of Persistent     Medications (ACE/ARB, digoxin, diuretics)    Potassium  Annually Potassium (mmol/L)   Date Value   10/04/2019 4.2    No flowsheet data found.     Creatinine  Annually Creatinine (mg/d hypoglycemic drug 2009    Glyburide   • Headache following lumbar puncture 3/2/2013   • History of tobacco use    • Hypertriglyceridemia 8/27/2018   • Infertility management     IVF to conceive twins - male factor - retrograde ejaculation   • Kidney stones since quitting: 3.6      Smokeless tobacco: Never Used    Alcohol use:  Yes      Alcohol/week: 2.0 standard drinks      Types: 2 Standard drinks or equivalent per week      Comment: 2 drinks a week    Drug use: No    Occ: Works [employer] :  intact    ASSESSMENT AND OTHER RELEVANT CHRONIC CONDITIONS:   Erik Foster is a 39year old female who presents for an [address]. PLAN SUMMARY:   1.  Annual physical exam  Healthy-weight/bmi normal  -Encourage healthy diet of whole f physical.    Diet counseling perfomed  Exercise counseling perfomed    SUGGESTED VACCINATIONS - Influenza, Pneumococcal, Zoster, Tetanus     Immunization History   Administered Date(s) Administered   • Fluvirin, 3 Years & >, Im 10/02/2013, 10/08/2014   • I

## 2020-09-11 NOTE — PROGRESS NOTES
Patient came in for draw of ordered fasting labs. Patient drawn out of left AC, x 1 attempt and tolerated well. 1 lt grn  tube drawn. Patient came in for draw of ordered fasting labs. Patient drawn out of right AC, x 2 attempt and tolerated well.   l

## 2020-09-13 ENCOUNTER — PATIENT MESSAGE (OUTPATIENT)
Dept: FAMILY MEDICINE CLINIC | Facility: CLINIC | Age: 45
End: 2020-09-13

## 2020-09-13 PROBLEM — IMO0002 INCREASE IN CREATININE: Status: ACTIVE | Noted: 2020-09-13

## 2020-09-13 PROBLEM — R79.89 INCREASE IN CREATININE: Status: ACTIVE | Noted: 2020-09-13

## 2020-09-14 NOTE — TELEPHONE ENCOUNTER
From: Yulissa Flores  To: Cristopher Bingham DO  Sent: 9/13/2020 12:31 PM CDT  Subject: Visit Follow-up Question    I see some labs are back, does the lipid panel take longer? Just making sure it was ordered.      Chente Ruff

## 2020-09-18 ENCOUNTER — TELEPHONE (OUTPATIENT)
Dept: FAMILY MEDICINE CLINIC | Facility: CLINIC | Age: 45
End: 2020-09-18

## 2020-09-18 NOTE — TELEPHONE ENCOUNTER
Health provider screening form completed and signed by PCP  Faxed to [facility] driven , Conf rec'd  [facility]

## 2020-09-20 ENCOUNTER — PATIENT MESSAGE (OUTPATIENT)
Dept: FAMILY MEDICINE CLINIC | Facility: CLINIC | Age: 45
End: 2020-09-20

## 2020-09-21 ENCOUNTER — HOSPITAL ENCOUNTER (OUTPATIENT)
Dept: MAMMOGRAPHY | Facility: HOSPITAL | Age: 45
Discharge: HOME | End: 2020-09-21
Attending: OBSTETRICS & GYNECOLOGY
Payer: COMMERCIAL

## 2020-09-21 DIAGNOSIS — Z12.31 ENCOUNTER FOR SCREENING MAMMOGRAM FOR MALIGNANT NEOPLASM OF BREAST: ICD-10-CM

## 2020-09-21 PROCEDURE — 77063 BREAST TOMOSYNTHESIS BI: CPT | Performed by: OBSTETRICS & GYNECOLOGY

## 2020-09-21 PROCEDURE — 77067 SCR MAMMO BI INCL CAD: CPT | Performed by: OBSTETRICS & GYNECOLOGY

## 2020-09-21 RX ORDER — RIMEGEPANT SULFATE 75 MG/75MG
75 TABLET, ORALLY DISINTEGRATING ORAL AS NEEDED
Qty: 9 TABLET | Refills: 3 | Status: CANCELLED | OUTPATIENT
Start: 2020-09-21

## 2020-09-21 NOTE — TELEPHONE ENCOUNTER
From: Conchita Baca  To: Dell Andrea DO  Sent: 9/20/2020 7:52 PM CDT  Subject: Prescription Question    I was getting my migraine meds through [facility]. She said she could send the rx. My pharmacy says they don't have it. Can you look into this for me.

## 2020-09-22 ENCOUNTER — TELEPHONE (OUTPATIENT)
Dept: FAMILY MEDICINE CLINIC | Facility: CLINIC | Age: 45
End: 2020-09-22

## 2020-09-22 NOTE — TELEPHONE ENCOUNTER
Pt called office stating she is completely out of her Migraine medication. And she needs a refill ASAP sent to her pharmacy. Pt states that she is at work and is difficult for her to answer her phone at this time.

## 2020-09-22 NOTE — TELEPHONE ENCOUNTER
Patient notified refill was sent to pharm on 9/11/220  Confirmed with pharmacy and they do have refill

## 2020-09-29 ENCOUNTER — TELEPHONE (OUTPATIENT)
Dept: OBGYN CLINIC | Facility: CLINIC | Age: 45
End: 2020-09-29

## 2020-09-29 NOTE — TELEPHONE ENCOUNTER
Spoke with patient. Routed pharmacies message to Dr Moon  for alternative recommendation for [facility]. Patient aware and understanding verbalized.

## 2020-09-29 NOTE — TELEPHONE ENCOUNTER
Spoke with patient and gave her all DR Mcnair recommendations. She had the coupon code for slynd but forgot to use it. I will give her 3 samples to get her to her appointment that is already scheduled.  She would like to discuss microgestin vs slynd aga

## 2020-09-29 NOTE — TELEPHONE ENCOUNTER
Pt just called and was told that to get her slynd medication she has to pay $160.00 and she does not want to pay that much. . She would like to talk to you about options and what is the best thing to do next. Please advise and call pt.  Thanks

## 2020-10-05 PROBLEM — R87.810 ASCUS WITH POSITIVE HIGH RISK HPV CERVICAL: Status: ACTIVE | Noted: 2020-09-01

## 2020-10-05 PROBLEM — R87.610 ASCUS WITH POSITIVE HIGH RISK HPV CERVICAL: Status: ACTIVE | Noted: 2020-09-01

## 2020-10-06 ENCOUNTER — OFFICE VISIT (OUTPATIENT)
Dept: OBGYN CLINIC | Facility: CLINIC | Age: 45
End: 2020-10-06
Payer: COMMERCIAL

## 2020-10-06 VITALS
BODY MASS INDEX: 27.29 KG/M2 | HEIGHT: 57.75 IN | WEIGHT: 130 LBS | DIASTOLIC BLOOD PRESSURE: 62 MMHG | SYSTOLIC BLOOD PRESSURE: 102 MMHG

## 2020-10-06 DIAGNOSIS — R87.810 ASCUS WITH POSITIVE HIGH RISK HPV CERVICAL: Primary | ICD-10-CM

## 2020-10-06 DIAGNOSIS — Z01.812 PRE-PROCEDURAL LABORATORY EXAMINATION: ICD-10-CM

## 2020-10-06 DIAGNOSIS — R87.610 ASCUS WITH POSITIVE HIGH RISK HPV CERVICAL: Primary | ICD-10-CM

## 2020-10-06 DIAGNOSIS — N76.0 VAGINITIS AND VULVOVAGINITIS: ICD-10-CM

## 2020-10-06 PROCEDURE — 57454 BX/CURETT OF CERVIX W/SCOPE: CPT | Performed by: OBSTETRICS & GYNECOLOGY

## 2020-10-06 PROCEDURE — 81025 URINE PREGNANCY TEST: CPT | Performed by: OBSTETRICS & GYNECOLOGY

## 2020-10-06 PROCEDURE — 3008F BODY MASS INDEX DOCD: CPT | Performed by: OBSTETRICS & GYNECOLOGY

## 2020-10-06 PROCEDURE — 3078F DIAST BP <80 MM HG: CPT | Performed by: OBSTETRICS & GYNECOLOGY

## 2020-10-06 PROCEDURE — 3074F SYST BP LT 130 MM HG: CPT | Performed by: OBSTETRICS & GYNECOLOGY

## 2020-10-06 PROCEDURE — 87660 TRICHOMONAS VAGIN DIR PROBE: CPT | Performed by: OBSTETRICS & GYNECOLOGY

## 2020-10-06 PROCEDURE — 87510 GARDNER VAG DNA DIR PROBE: CPT | Performed by: OBSTETRICS & GYNECOLOGY

## 2020-10-06 PROCEDURE — 87480 CANDIDA DNA DIR PROBE: CPT | Performed by: OBSTETRICS & GYNECOLOGY

## 2020-10-06 PROCEDURE — 88305 TISSUE EXAM BY PATHOLOGIST: CPT | Performed by: OBSTETRICS & GYNECOLOGY

## 2020-10-06 NOTE — PROCEDURES
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 Recently started progestin only OCP Slynd.  Recently noticed some external irritation with wiping on the in

## 2020-10-09 NOTE — PROGRESS NOTES
Patient aware of normal results & MD recommendations: Vaginitis swab negative for yeast infection. Patient was given Rx Terazol 7. If she wishes to try that, she can.  Otherwise we could consider vaginal hyaluronic acid (non-hormonal vaginal moisturizer), R

## 2020-10-10 NOTE — PROGRESS NOTES
Contacted patient. She reports that she received results via Snapt. Questions answered. Patient states understanding.     Copy to pap pool   updated to 1 year

## 2020-11-12 ENCOUNTER — LAB ENCOUNTER (OUTPATIENT)
Dept: LAB | Age: 45
End: 2020-11-12
Attending: PREVENTIVE MEDICINE
Payer: COMMERCIAL

## 2020-11-12 ENCOUNTER — TELEPHONE (OUTPATIENT)
Dept: INTERNAL MEDICINE CLINIC | Facility: HOSPITAL | Age: 45
End: 2020-11-12

## 2020-11-12 ENCOUNTER — TELEMEDICINE (OUTPATIENT)
Dept: FAMILY MEDICINE CLINIC | Facility: CLINIC | Age: 45
End: 2020-11-12
Payer: COMMERCIAL

## 2020-11-12 DIAGNOSIS — J01.10 ACUTE FRONTAL SINUSITIS, RECURRENCE NOT SPECIFIED: Primary | ICD-10-CM

## 2020-11-12 DIAGNOSIS — Z20.822 SUSPECTED 2019 NOVEL CORONAVIRUS INFECTION: Primary | ICD-10-CM

## 2020-11-12 DIAGNOSIS — Z20.822 SUSPECTED 2019 NOVEL CORONAVIRUS INFECTION: ICD-10-CM

## 2020-11-12 PROCEDURE — 99214 OFFICE O/P EST MOD 30 MIN: CPT | Performed by: FAMILY MEDICINE

## 2020-11-12 RX ORDER — AMOXICILLIN AND CLAVULANATE POTASSIUM 875; 125 MG/1; MG/1
1 TABLET, FILM COATED ORAL 2 TIMES DAILY
Qty: 14 TABLET | Refills: 0 | Status: SHIPPED | OUTPATIENT
Start: 2020-11-12 | End: 2020-11-19

## 2020-11-12 NOTE — PROGRESS NOTES
Video Visit    This visit is conducted using Telemedicine with live, interactive video and audio. Leeann Valiente  verbally consents to a Video visit.     Patient understands and accepts financial responsibility for any deductible, co-insurance and/or

## 2020-12-07 ENCOUNTER — TELEPHONE (OUTPATIENT)
Dept: INTERNAL MEDICINE CLINIC | Facility: HOSPITAL | Age: 45
End: 2020-12-07

## 2020-12-07 ENCOUNTER — NURSE ONLY (OUTPATIENT)
Dept: LAB | Facility: HOSPITAL | Age: 45
End: 2020-12-07
Attending: PREVENTIVE MEDICINE
Payer: COMMERCIAL

## 2020-12-07 DIAGNOSIS — Z20.822 SUSPECTED 2019 NOVEL CORONAVIRUS INFECTION: ICD-10-CM

## 2020-12-07 DIAGNOSIS — Z20.822 SUSPECTED 2019 NOVEL CORONAVIRUS INFECTION: Primary | ICD-10-CM

## 2020-12-07 PROCEDURE — 87426 SARSCOV CORONAVIRUS AG IA: CPT

## 2020-12-07 NOTE — TELEPHONE ENCOUNTER
Results and RTW guidelines:    COVID RESULT GIVEN:      Test type:    [x] Rapid  antigen       []       [x] NEGATIVE     Ordered  retest?  []Yes   [x] No (skip to RTW)        Notes: reports all sx are mild; has mild HA, cough, and body aches;

## 2020-12-07 NOTE — TELEPHONE ENCOUNTER
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 [] Yes    [x] No   If yes, explain:       NOTES: Sage Alpesh called c/o the above s/s that have worsen over the last couple of days. PLAN:   [x]No Known Exposure :  [x] Symptomatic: Test w/ Rapid now.  Stay home until further instruc

## 2020-12-21 ENCOUNTER — IMMUNIZATION (OUTPATIENT)
Dept: LAB | Facility: HOSPITAL | Age: 45
End: 2020-12-21
Attending: PREVENTIVE MEDICINE
Payer: COMMERCIAL

## 2020-12-21 ENCOUNTER — PATIENT MESSAGE (OUTPATIENT)
Dept: OBGYN CLINIC | Facility: CLINIC | Age: 45
End: 2020-12-21

## 2020-12-21 DIAGNOSIS — J01.20 SUBACUTE ETHMOIDAL SINUSITIS: ICD-10-CM

## 2020-12-21 DIAGNOSIS — Z23 NEED FOR VACCINATION: ICD-10-CM

## 2020-12-21 PROCEDURE — 0001A PFIZER-BIONTECH COVID-19 VACCINE: CPT

## 2020-12-22 RX ORDER — FLUTICASONE PROPIONATE 50 MCG
SPRAY, SUSPENSION (ML) NASAL
Qty: 3 BOTTLE | Refills: 3 | Status: SHIPPED | OUTPATIENT
Start: 2020-12-22 | End: 2021-12-13

## 2020-12-22 NOTE — TELEPHONE ENCOUNTER
Pt requesting refill of   Requested Prescriptions     Pending Prescriptions Disp Refills   • FLUTICASONE PROPIONATE 50 MCG/ACT Nasal Suspension [Pharmacy Med Name: FLUTICASONE PROP 50 MCG SPRAY]  3     Sig: SPRAY 1 SPRAY INTO EACH NOSTRIL TWICE A DAY

## 2020-12-31 NOTE — TELEPHONE ENCOUNTER
From: Dione Angelucci  To: Demar Shirley MD  Sent: 12/21/2020 6:16 AM CST  Subject: Prescription Question    I know we were waiting til the first of the year to see if insurance changes would cover the sound.  I thought I had one more sample box to get

## 2021-01-04 ENCOUNTER — TELEPHONE (OUTPATIENT)
Dept: FAMILY MEDICINE CLINIC | Facility: CLINIC | Age: 46
End: 2021-01-04

## 2021-01-04 RX ORDER — DROSPIRENONE 4 MG/1
1 TABLET, FILM COATED ORAL DAILY
COMMUNITY
Start: 2020-12-21 | End: 2021-03-01

## 2021-01-04 NOTE — TELEPHONE ENCOUNTER
Prior authorization needed for Nurtec 75 mg. Sent to plan via cover my meds. Awaiting determination.

## 2021-01-04 NOTE — TELEPHONE ENCOUNTER
Received fax from [employer], requesting additional information for Prior Auth. Patient positive for reaction to previous triptan therapy, chest pain. Should continue with Nurtec 75 mg. Submitted via fax, with confirmation.   Awaiting det

## 2021-01-11 ENCOUNTER — IMMUNIZATION (OUTPATIENT)
Dept: LAB | Facility: HOSPITAL | Age: 46
End: 2021-01-11
Attending: PREVENTIVE MEDICINE

## 2021-01-11 DIAGNOSIS — Z23 NEED FOR VACCINATION: ICD-10-CM

## 2021-01-11 PROCEDURE — 0002A SARSCOV2 VAC 30MCG/0.3ML IM: CPT

## 2021-02-15 ENCOUNTER — LAB ENCOUNTER (OUTPATIENT)
Dept: LAB | Facility: HOSPITAL | Age: 46
End: 2021-02-15
Attending: FAMILY MEDICINE
Payer: COMMERCIAL

## 2021-02-15 DIAGNOSIS — R79.89 INCREASE IN CREATININE: ICD-10-CM

## 2021-02-15 LAB
ANION GAP SERPL CALC-SCNC: 7 MMOL/L (ref 0–18)
BUN BLD-MCNC: 21 MG/DL (ref 7–18)
BUN/CREAT SERPL: 24.1 (ref 10–20)
CALCIUM BLD-MCNC: 9.5 MG/DL (ref 8.5–10.1)
CHLORIDE SERPL-SCNC: 108 MMOL/L (ref 98–112)
CO2 SERPL-SCNC: 26 MMOL/L (ref 21–32)
CREAT BLD-MCNC: 0.87 MG/DL
GLUCOSE BLD-MCNC: 89 MG/DL (ref 70–99)
OSMOLALITY SERPL CALC.SUM OF ELEC: 294 MOSM/KG (ref 275–295)
PATIENT FASTING Y/N/NP: YES
POTASSIUM SERPL-SCNC: 3.8 MMOL/L (ref 3.5–5.1)
SODIUM SERPL-SCNC: 141 MMOL/L (ref 136–145)

## 2021-02-15 PROCEDURE — 80048 BASIC METABOLIC PNL TOTAL CA: CPT

## 2021-02-15 PROCEDURE — 36415 COLL VENOUS BLD VENIPUNCTURE: CPT

## 2021-02-18 RX ORDER — RIMEGEPANT SULFATE 75 MG/75MG
1 TABLET, ORALLY DISINTEGRATING ORAL AS NEEDED
Qty: 8 TABLET | Refills: 3 | Status: SHIPPED | OUTPATIENT
Start: 2021-02-18 | End: 2021-08-04

## 2021-03-01 ENCOUNTER — TELEPHONE (OUTPATIENT)
Dept: OBGYN CLINIC | Facility: CLINIC | Age: 46
End: 2021-03-01

## 2021-03-01 DIAGNOSIS — Z30.09 GENERAL COUNSELING AND ADVICE FOR CONTRACEPTIVE MANAGEMENT: Primary | ICD-10-CM

## 2021-03-01 NOTE — TELEPHONE ENCOUNTER
Patient is calling back to ask the nurse for 1 sample pack of Slynd to get her through until she is on something else. 1 sample pack is at the front for patient to .

## 2021-03-01 NOTE — TELEPHONE ENCOUNTER
Pt states pharmacy will no longer take coupon and does not want to go through [facility] at this time. Would like an rx sent for different progestin only bc. Will route to provider for advisement.

## 2021-03-09 ENCOUNTER — HOSPITAL ENCOUNTER (OUTPATIENT)
Dept: CT IMAGING | Age: 46
Discharge: HOME | End: 2021-03-09
Attending: FAMILY MEDICINE

## 2021-03-09 DIAGNOSIS — Z13.9 SCREENING FOR CONDITION: ICD-10-CM

## 2021-03-31 ENCOUNTER — OFFICE VISIT (OUTPATIENT)
Dept: OBGYN CLINIC | Facility: CLINIC | Age: 46
End: 2021-03-31
Payer: COMMERCIAL

## 2021-03-31 VITALS
BODY MASS INDEX: 27.91 KG/M2 | WEIGHT: 129.38 LBS | HEART RATE: 82 BPM | HEIGHT: 57 IN | DIASTOLIC BLOOD PRESSURE: 60 MMHG | SYSTOLIC BLOOD PRESSURE: 98 MMHG

## 2021-03-31 DIAGNOSIS — N76.0 VAGINITIS AND VULVOVAGINITIS: ICD-10-CM

## 2021-03-31 DIAGNOSIS — R30.0 DYSURIA: Primary | ICD-10-CM

## 2021-03-31 PROCEDURE — 87660 TRICHOMONAS VAGIN DIR PROBE: CPT | Performed by: OBSTETRICS & GYNECOLOGY

## 2021-03-31 PROCEDURE — 3078F DIAST BP <80 MM HG: CPT | Performed by: OBSTETRICS & GYNECOLOGY

## 2021-03-31 PROCEDURE — 87480 CANDIDA DNA DIR PROBE: CPT | Performed by: OBSTETRICS & GYNECOLOGY

## 2021-03-31 PROCEDURE — 87086 URINE CULTURE/COLONY COUNT: CPT | Performed by: OBSTETRICS & GYNECOLOGY

## 2021-03-31 PROCEDURE — 3008F BODY MASS INDEX DOCD: CPT | Performed by: OBSTETRICS & GYNECOLOGY

## 2021-03-31 PROCEDURE — 99212 OFFICE O/P EST SF 10 MIN: CPT | Performed by: OBSTETRICS & GYNECOLOGY

## 2021-03-31 PROCEDURE — 87510 GARDNER VAG DNA DIR PROBE: CPT | Performed by: OBSTETRICS & GYNECOLOGY

## 2021-03-31 PROCEDURE — 3074F SYST BP LT 130 MM HG: CPT | Performed by: OBSTETRICS & GYNECOLOGY

## 2021-03-31 NOTE — PROGRESS NOTES
Jackson Ma is a 39year old female.     HPI:   Patient presents with:  Gyn Problem: Feels like she has yeast inf Discharge and irritation      Monday she felt like she had some dysuria and frequency she started taking Pyridium for 3 or 4 days it got

## 2021-04-01 ENCOUNTER — TELEPHONE (OUTPATIENT)
Dept: OBGYN CLINIC | Facility: CLINIC | Age: 46
End: 2021-04-01

## 2021-05-03 ENCOUNTER — TELEPHONE (OUTPATIENT)
Dept: FAMILY MEDICINE CLINIC | Facility: CLINIC | Age: 46
End: 2021-05-03

## 2021-05-03 ENCOUNTER — OFFICE VISIT (OUTPATIENT)
Dept: FAMILY MEDICINE CLINIC | Facility: CLINIC | Age: 46
End: 2021-05-03
Payer: COMMERCIAL

## 2021-05-03 VITALS
SYSTOLIC BLOOD PRESSURE: 102 MMHG | OXYGEN SATURATION: 98 % | HEART RATE: 90 BPM | WEIGHT: 130 LBS | HEIGHT: 57 IN | TEMPERATURE: 98 F | BODY MASS INDEX: 28.05 KG/M2 | RESPIRATION RATE: 18 BRPM | DIASTOLIC BLOOD PRESSURE: 64 MMHG

## 2021-05-03 DIAGNOSIS — H81.12 BENIGN PAROXYSMAL VERTIGO OF LEFT EAR: Primary | ICD-10-CM

## 2021-05-03 PROCEDURE — 3078F DIAST BP <80 MM HG: CPT | Performed by: NURSE PRACTITIONER

## 2021-05-03 PROCEDURE — 3008F BODY MASS INDEX DOCD: CPT | Performed by: NURSE PRACTITIONER

## 2021-05-03 PROCEDURE — 3074F SYST BP LT 130 MM HG: CPT | Performed by: NURSE PRACTITIONER

## 2021-05-03 PROCEDURE — 99213 OFFICE O/P EST LOW 20 MIN: CPT | Performed by: NURSE PRACTITIONER

## 2021-05-03 RX ORDER — MECLIZINE HYDROCHLORIDE 25 MG/1
25 TABLET ORAL 3 TIMES DAILY PRN
Qty: 30 TABLET | Refills: 0 | Status: SHIPPED | OUTPATIENT
Start: 2021-05-03

## 2021-05-03 RX ORDER — ONDANSETRON 4 MG/1
4 TABLET, FILM COATED ORAL EVERY 8 HOURS PRN
Qty: 15 TABLET | Refills: 0 | Status: SHIPPED | OUTPATIENT
Start: 2021-05-03

## 2021-05-03 NOTE — PROGRESS NOTES
HPI/Subjective:   Patient ID: Cheryle Pound is a 39year old female. Patient presents to the clinic today for evaluation of dizziness feeling. States symptoms began this morning. Does have hx of vertigo, apprx 10 years ago.  Was treated with meclizin loratadine 10 MG Oral Tab Take 10 mg by mouth daily.        Allergies:  Prednisone              MYALGIA  Sumatriptan             OTHER (SEE COMMENTS)    Comment:Chest pain  Cefdinir                RASH    Comment:Headache    Objective:   Physical Exam  Cons Ondansetron HCl (ZOFRAN) 4 mg tablet 15 tablet 0     Sig: Take 1 tablet (4 mg total) by mouth every 8 (eight) hours as needed for Nausea.        Imaging & Referrals:  None

## 2021-05-03 NOTE — PATIENT INSTRUCTIONS
Begin taking meclizine as needed for the spinning sensation. Take Leipsic Spar as needed for nausea     Move positions slowly     Continue to monitor symptoms. Follow up as needed with any continued concerns.  Will consider physical therapy should symptom

## 2021-05-03 NOTE — TELEPHONE ENCOUNTER
Pt called office stating her insurance is not covering the medication prescribed to her for dizziness. Pt wants to know if there is a generic option of the medication.

## 2021-05-03 NOTE — TELEPHONE ENCOUNTER
Spoke to pt. She states she was able to obtain the meclizine prescription. It was only $11 out-of-pocket cost for her.

## 2021-05-03 NOTE — TELEPHONE ENCOUNTER
Pt states she woke up today with mild to moderate vertigo - she did have to hold on to objects for support to get to the bathroom   Not falling over  A little nauseated   No blurred vision, CP, SOB, headaches  Felt fine over the weekend states she may be a

## 2021-05-03 NOTE — TELEPHONE ENCOUNTER
Meclizine is the generic form of the medication.   May not be covered because can also be bought over-the-counter now ( name brand Bonine, or sold under generic pharmacy name [facility] \"motion sickness relief\" )  There is a lower 12.5 mg dose that may be less e

## 2021-08-03 ENCOUNTER — TELEPHONE (OUTPATIENT)
Dept: OBGYN CLINIC | Facility: CLINIC | Age: 46
End: 2021-08-03

## 2021-08-03 NOTE — TELEPHONE ENCOUNTER
Left message for pt informing her that Dr. Luís Flores is out of the office on 9/1 and we need to reschedule her appointment.

## 2021-08-04 RX ORDER — RIMEGEPANT SULFATE 75 MG/75MG
1 TABLET, ORALLY DISINTEGRATING ORAL DAILY PRN
Qty: 8 TABLET | Refills: 0 | Status: SHIPPED | OUTPATIENT
Start: 2021-08-04 | End: 2021-08-31

## 2021-08-04 NOTE — TELEPHONE ENCOUNTER
Pt requesting refill of   Requested Prescriptions     Pending Prescriptions Disp Refills   • NURTEC 75 MG Oral Tablet Dispersible [Pharmacy Med Name: Adrian Perkins ODT 75 MG TABLET] 8 tablet 3     Sig: TAKE 1 TABLET BY MOUTH EVERY DAY AS NEEDED     No protocol av

## 2021-08-04 NOTE — TELEPHONE ENCOUNTER
Refilled Rx [facility]-last visit 9/2020-due for annual visit-no further refills until seen. Needs annual visit.   Please inform

## 2021-08-05 NOTE — H&P
NURSING INTAKE COMMENTS: Patient presents with:  Consult: C/o right arm pain since December 2019. Stts pain is noticed near the elbow that radiates downward. Recalls no injuries. Denies any numbness, tingling or swelling.   Noticed weakness and pain when Patient was prescribed actos and she is really concerned about what it can cause. She wants to talk to the doctor.  Later in the day better for the call.     Sexual Activity      Alcohol use:  Yes        Alcohol/week: 2.0 standard drinks        Types: 2 Standard drinks or equivalent per week        Comment: 2 drinks a week      Drug use: No      Sexual activity: Not on file       Review of Systems:  GENERAL: fee 10/04/2019    GFR 84 11/27/2017    GFRNAA 80 10/04/2019    GFRAA 92 10/04/2019        Assessment and Plan:  Diagnoses and all orders for this visit:    Right lateral epicondylitis  -     INJECTION; TENDON ORIGIN/INSERTION  -     methylPREDNISolone acetate

## 2021-08-28 ENCOUNTER — OFFICE VISIT (OUTPATIENT)
Dept: OBGYN CLINIC | Facility: CLINIC | Age: 46
End: 2021-08-28
Payer: COMMERCIAL

## 2021-08-28 VITALS
DIASTOLIC BLOOD PRESSURE: 74 MMHG | BODY MASS INDEX: 29.12 KG/M2 | SYSTOLIC BLOOD PRESSURE: 122 MMHG | HEART RATE: 97 BPM | HEIGHT: 57 IN | WEIGHT: 135 LBS

## 2021-08-28 DIAGNOSIS — Z71.85 HPV VACCINE COUNSELING: ICD-10-CM

## 2021-08-28 DIAGNOSIS — R87.810 ASCUS WITH POSITIVE HIGH RISK HPV CERVICAL: ICD-10-CM

## 2021-08-28 DIAGNOSIS — G43.829 MENSTRUAL MIGRAINE WITHOUT STATUS MIGRAINOSUS, NOT INTRACTABLE: ICD-10-CM

## 2021-08-28 DIAGNOSIS — Z01.411 ENCOUNTER FOR WELL WOMAN EXAM WITH ABNORMAL FINDINGS: Primary | ICD-10-CM

## 2021-08-28 DIAGNOSIS — R87.610 ASCUS WITH POSITIVE HIGH RISK HPV CERVICAL: ICD-10-CM

## 2021-08-28 DIAGNOSIS — Z30.09 GENERAL COUNSELING AND ADVICE FOR CONTRACEPTIVE MANAGEMENT: ICD-10-CM

## 2021-08-28 DIAGNOSIS — Z12.31 ENCOUNTER FOR SCREENING MAMMOGRAM FOR MALIGNANT NEOPLASM OF BREAST: ICD-10-CM

## 2021-08-28 DIAGNOSIS — G43.109 MIGRAINE WITH AURA AND WITHOUT STATUS MIGRAINOSUS, NOT INTRACTABLE: ICD-10-CM

## 2021-08-28 PROCEDURE — 87624 HPV HI-RISK TYP POOLED RSLT: CPT | Performed by: OBSTETRICS & GYNECOLOGY

## 2021-08-28 PROCEDURE — 88175 CYTOPATH C/V AUTO FLUID REDO: CPT | Performed by: OBSTETRICS & GYNECOLOGY

## 2021-08-28 PROCEDURE — 3074F SYST BP LT 130 MM HG: CPT | Performed by: OBSTETRICS & GYNECOLOGY

## 2021-08-28 PROCEDURE — 3008F BODY MASS INDEX DOCD: CPT | Performed by: OBSTETRICS & GYNECOLOGY

## 2021-08-28 PROCEDURE — 3078F DIAST BP <80 MM HG: CPT | Performed by: OBSTETRICS & GYNECOLOGY

## 2021-08-28 PROCEDURE — 99396 PREV VISIT EST AGE 40-64: CPT | Performed by: OBSTETRICS & GYNECOLOGY

## 2021-08-28 NOTE — H&P
[facility]  Obstetrics and Gynecology   History & Physical  Established    Chief complaint: Patient presents with:  Wellness Visit: pt has no complaints         Subjective:     HPI: Tiana Quarry is a 42year old  with LMP No LMP recor Contraception:   Was on OCP Microgestin 1.5/30 continuously in the past.   3/2021 switched to NE 2.5 mg nightly -> generally amenorrheic    STDs: no   HPV vaccine no     Pap negative per patient around 2018.  Had a mildly abnormal one around 2016 but repe (eight) hours as needed for Nausea., Disp: 15 tablet, Rfl: 0  FLUTICASONE PROPIONATE 50 MCG/ACT Nasal Suspension, SPRAY 1 SPRAY INTO EACH NOSTRIL TWICE A DAY, Disp: 3 Bottle, Rfl: 3  loratadine 10 MG Oral Tab, Take 10 mg by mouth daily. , Disp: , Rfl:     N B1   • Vitamin D deficiency 2014       PSH:  Past Surgical History:   Procedure Laterality Date   •   2005    Primary  - failure to progress   •   10/28/2009    RCS for twins   • COLPOSCOPY,BX CERVIX/ENDOCERV CURR  10/06 Diet: Not Asked        Back Care: Not Asked        Exercise: Yes          1-2 days a week        Bike Helmet: Not Asked        Seat Belt: Yes        Self-Exams: No    Social History Narrative      Not on file    Social Determinants of Health  Financial Res discharge or skin changes. Negative axillary lymphadenopathy. Abdominal: Soft. She exhibits no distension and no mass. There is no abdominal tenderness. There is no rebound and no guarding. No hernia.    Genitourinary:    Genitourinary Comments: Vulva no A1C 4.7 2020       Imaging:  No results found. Assessment:     Traci Carroll is a 42year old  female here for well woman exam, on continuous hormonal contraception since  for prevention of menstrual migraines.  Doing overall wel

## 2021-08-28 NOTE — PATIENT INSTRUCTIONS
Patient Information about GARDASIL®9 (pronounced “[id number]”)  (Human Papillomavirus 9-valent Vaccine, Recombinant)    IT IS RECOMMENDED THAT YOU CHECK WITH YOUR INSURANCE ABOUT COVERAGE FOR THIS VACCINE PRIOR TO ITS ADMINISTRATION    Read this info not protect the person getting GARDASIL 9 from a disease that is caused by other types of  HPV, other viruses or bacteria. • Does not treat HPV infection. • Does not protect the person getting GARDASIL 9 from HPV types that he/she may already have.   JIMENA a 2-dose schedule will be determined by your health care  Professional.    Make sure that you or your child gets all doses recommended by your health care professional so that  you or your child gets the best protection.  If the person getting GARDASIL 9 mi wheezing (bronchospasm)  • hives  • rash    Additional side effects that have been reported during general use for GARDASIL 9 are shown below. Side effects reported during the general use of GARDASIL are also shown below.  GARDASIL side effects  are report visit  www. [employer].com. For patent information: www.[employer].com/product/patent/home.html  The trademarks depicted herein are owned by their respective companies.   Copyright © 3559-7756 [address] [place] [address],[address] a subsidiary of [address] [place] [address]

## 2021-08-30 LAB — HPV I/H RISK 1 DNA SPEC QL NAA+PROBE: NEGATIVE

## 2021-08-31 RX ORDER — RIMEGEPANT SULFATE 75 MG/75MG
TABLET, ORALLY DISINTEGRATING ORAL
Qty: 8 TABLET | Refills: 0 | Status: SHIPPED | OUTPATIENT
Start: 2021-08-31 | End: 2021-09-14

## 2021-08-31 NOTE — TELEPHONE ENCOUNTER
Pt requesting refill of   Requested Prescriptions     Pending Prescriptions Disp Refills   • NURTEC 75 MG Oral Tablet Dispersible [Pharmacy Med Name: Scotty Blissbaum ODT 75 MG TABLET] 8 tablet 0     Sig: TAKE 1 TABLET BY MOUTH EVERY DAY AS NEEDED        No protocol

## 2021-09-14 ENCOUNTER — OFFICE VISIT (OUTPATIENT)
Dept: FAMILY MEDICINE CLINIC | Facility: CLINIC | Age: 46
End: 2021-09-14
Payer: COMMERCIAL

## 2021-09-14 VITALS
DIASTOLIC BLOOD PRESSURE: 64 MMHG | OXYGEN SATURATION: 98 % | RESPIRATION RATE: 16 BRPM | TEMPERATURE: 99 F | HEIGHT: 59 IN | BODY MASS INDEX: 27.54 KG/M2 | SYSTOLIC BLOOD PRESSURE: 108 MMHG | HEART RATE: 100 BPM | WEIGHT: 136.63 LBS

## 2021-09-14 DIAGNOSIS — E78.2 MIXED HYPERLIPIDEMIA: ICD-10-CM

## 2021-09-14 DIAGNOSIS — R87.810 ASCUS WITH POSITIVE HIGH RISK HPV CERVICAL: ICD-10-CM

## 2021-09-14 DIAGNOSIS — Z13.21 SCREENING FOR ENDOCRINE, NUTRITIONAL, METABOLIC AND IMMUNITY DISORDER: ICD-10-CM

## 2021-09-14 DIAGNOSIS — G43.809 OTHER MIGRAINE WITHOUT STATUS MIGRAINOSUS, NOT INTRACTABLE: ICD-10-CM

## 2021-09-14 DIAGNOSIS — Z13.29 SCREENING FOR ENDOCRINE, NUTRITIONAL, METABOLIC AND IMMUNITY DISORDER: ICD-10-CM

## 2021-09-14 DIAGNOSIS — Z00.00 ANNUAL PHYSICAL EXAM: Primary | ICD-10-CM

## 2021-09-14 DIAGNOSIS — Z13.228 SCREENING FOR ENDOCRINE, NUTRITIONAL, METABOLIC AND IMMUNITY DISORDER: ICD-10-CM

## 2021-09-14 DIAGNOSIS — Z86.79 HISTORY OF PSVT (PAROXYSMAL SUPRAVENTRICULAR TACHYCARDIA): ICD-10-CM

## 2021-09-14 DIAGNOSIS — R87.610 ASCUS WITH POSITIVE HIGH RISK HPV CERVICAL: ICD-10-CM

## 2021-09-14 DIAGNOSIS — Z13.0 SCREENING FOR ENDOCRINE, NUTRITIONAL, METABOLIC AND IMMUNITY DISORDER: ICD-10-CM

## 2021-09-14 PROCEDURE — 3008F BODY MASS INDEX DOCD: CPT | Performed by: FAMILY MEDICINE

## 2021-09-14 PROCEDURE — 3074F SYST BP LT 130 MM HG: CPT | Performed by: FAMILY MEDICINE

## 2021-09-14 PROCEDURE — 99396 PREV VISIT EST AGE 40-64: CPT | Performed by: FAMILY MEDICINE

## 2021-09-14 PROCEDURE — 3078F DIAST BP <80 MM HG: CPT | Performed by: FAMILY MEDICINE

## 2021-09-14 RX ORDER — RIMEGEPANT SULFATE 75 MG/75MG
1 TABLET, ORALLY DISINTEGRATING ORAL DAILY PRN
Qty: 8 TABLET | Refills: 10 | Status: SHIPPED | OUTPATIENT
Start: 2021-09-14

## 2021-09-14 NOTE — PATIENT INSTRUCTIONS
Perform labs fasting 8 hours with water or black coffee or or black tea diet  soda only prior to exam.    -Encourage healthy diet of whole food and avoid processed food and sugary drinks and sodas.   Diet should include lean meats and vegetables including 5 exercise? Exercising regularly offers many healthy rewards.  It can help you do all of the following:  · Improve your blood cholesterol level to help prevent further heart trouble  · Lower your blood pressure to help prevent a stroke or heart attack  · Con substitute for professional medical care. Always follow your healthcare professional's instructions. Eating Heart-Healthy Foods  Eating has a big impact on your heart health. In fact, eating healthier can improve several of your heart risks at once.  Daniel Jules keep a diary to record what you eat and how often you exercise. Choose the right foods  Aim to make these foods staples of your diet.  If you have diabetes, you may have different recommendations than what is listed here:  · Fruits and vegetables provide p spice up your food without adding calories, fat, or sodium. Try these items: horseradish, hot sauce, lemon, mustard, nonfat salad dressings, and vinegar. For salt-free herbs and spices, try basil, cilantro, cinnamon, pepper, and rosemary.   Date Last Review increased risk for fractures. With age, the quality and quantity of bone declines. You can lessen bone loss by staying active and increasing your calcium intake. Calcium supplements and other osteoporosis treatments do have risks.  So talk with your healthc may vary depending on brand and size.   Daily calcium needs  15to 25years old: 1,300 mg  23to 27years old: 1,000 mg  32to 48years old: 1,000 mg  46to 79years old, women: 1,200 mg  46to 79years old, men: 1,000 mg  Pregnant or nursin to 18 year disease  · Have dark skin pigmentation  · Being a  baby  Vitamin D has many effects in the body.  You may need this test to help your healthcare provider diagnose or treat:  · Problems with the parathyroid gland  · Cancer  · Autoimmune diseases, burks vitamin D in supplement form can affect your vitamin D levels. Ask your healthcare provider if any health conditions you have or medicines you take could affect your results.   How do I get ready for this test?  Tell your healthcare provider if you take vit Sushil [address] [place], [address] [place]. All rights reserved. This information is not intended as a substitute for professional medical care. Always follow your healthcare professional's instructions.           Living with Leyla Russo

## 2021-09-14 NOTE — PROGRESS NOTES
REASON FOR VISIT:    Dione Angelucci is a 42year old female who presents for an [address]. Migraines-stable can have 2-8 a month. Varies depends on stress levels. Any travel will trigger a migraine.   Patient takes Nurtec and complete recommended HPV  FH significant: adopted      Wt Readings from Last 6 Encounters:  09/14/21 : 136 lb 9.6 oz (62 kg)  08/28/21 : 135 lb (61.2 kg)  05/03/21 : 130 lb (59 kg)  03/31/21 : 129 lb 6.4 oz (58.7 kg)  10/06/20 : 130 lb (59 kg)  09/11/20 : 127 lb 9. Component Value Date    CHOLEST 191 09/11/2020    CHOLEST 188 10/04/2019    CHOLEST 199 08/27/2018     Lab Results   Component Value Date    HDL 46 09/11/2020    HDL 46 10/04/2019    HDL 43 08/27/2018     No results found for: TRIGLY  Lab Results   [place] pleasure in doing things (over the last two weeks)?: Not at all    Feeling down, depressed, or hopeless (over the last two weeks)?: Not at all    PHQ-2 SCORE: 0        PREVENTATIVE SERVICES  INDICATIONS AND SCHEDULE Recommendation Internal Lab or Procedure (ACE/ARB, digoxin, diuretics)    Potassium  Annually Potassium (mmol/L)   Date Value   02/15/2021 3.8    No flowsheet data found. Creatinine  Annually Creatinine (mg/dL)   Date Value   02/15/2021 0.87    No flowsheet data found.     Digoxin Serum Conc  A high risk HPV cervical 06/2015   • Bronchitis 1/9/2015   • Calculus of kidney 2005   • Dysplasia of cervix, low grade (REN 1) 10/06/2020    CIN1, negative ECC    • Environmental allergies 12/20/2018   • External hemorrhoids 8/7/2014   • Eye muscle weakness perinal skin tag - for thrombosed external hemorrhoid   • HERNIA SURGERY  2005    \"triple hernia\" surgery   • KNEE SURGERY Left 1994    ACL repair   • LAPAROSCOPY PROCEDURE UNLISTED  09/23/2008    Laparoscopy, lysis of adhesions, bilateral resection of p apparent distress  SKIN: no rashes, no suspicious lesions  HEENT: atraumatic, normocephalic, ears clear-wearing mask  EYES:PERRLA, EOMI, normal optic disk, conjunctiva are clear  NECK: supple, no adenopathy, no bruits  CHEST: no chest tenderness  BREAST: D least 5 days weekly  Fasting lipids annually if low risk ASCVD score    3.  Other migraine without status migrainosus, not intractable  Continue Nurtex as needed, FU with neurology as needed  Went on line and went to [employer] and filled out questionaire   Side Influenza 01/01/2013, 10/01/2017, 10/10/2018, 10/07/2019   • TDAP 01/01/2013, 02/06/2013   • Varicella Deferred (Had Chicken Pox) 08/21/1988       Influenza Annually   Pneumococcal if high risk   Td/Tdap once then every 10 years   HPV Females 11-26: 3 dose

## 2021-09-30 ENCOUNTER — TELEPHONE (OUTPATIENT)
Dept: FAMILY MEDICINE CLINIC | Facility: CLINIC | Age: 46
End: 2021-09-30

## 2021-09-30 NOTE — TELEPHONE ENCOUNTER
Received an Employer Wellness form thru the fax. LVM for patient to complete the labs on file in order for the form to be completed.

## 2021-10-14 NOTE — TELEPHONE ENCOUNTER
LMOM to return call to the office as this is 2nd attempt to reach you. Provided pt office phone [phone] along with office hours. Informed order placed for labs to be completed for employer wellness form.

## 2021-10-18 NOTE — TELEPHONE ENCOUNTER
LMOM to return call to the office as this was my 3rd and final attempt to reach you. Provided pt office phone [phone] along with office hours. Mailed patient a letter to contact office. Closing encounter.

## 2021-10-21 ENCOUNTER — LAB ENCOUNTER (OUTPATIENT)
Dept: LAB | Facility: HOSPITAL | Age: 46
End: 2021-10-21
Attending: FAMILY MEDICINE
Payer: COMMERCIAL

## 2021-10-21 DIAGNOSIS — R73.01 ELEVATED FASTING GLUCOSE: ICD-10-CM

## 2021-10-21 DIAGNOSIS — Z13.29 SCREENING FOR ENDOCRINE, NUTRITIONAL, METABOLIC AND IMMUNITY DISORDER: ICD-10-CM

## 2021-10-21 DIAGNOSIS — Z13.21 SCREENING FOR ENDOCRINE, NUTRITIONAL, METABOLIC AND IMMUNITY DISORDER: ICD-10-CM

## 2021-10-21 DIAGNOSIS — Z00.00 ANNUAL PHYSICAL EXAM: ICD-10-CM

## 2021-10-21 DIAGNOSIS — Z13.228 SCREENING FOR ENDOCRINE, NUTRITIONAL, METABOLIC AND IMMUNITY DISORDER: ICD-10-CM

## 2021-10-21 DIAGNOSIS — Z13.0 SCREENING FOR ENDOCRINE, NUTRITIONAL, METABOLIC AND IMMUNITY DISORDER: ICD-10-CM

## 2021-10-21 DIAGNOSIS — E78.2 MIXED HYPERLIPIDEMIA: ICD-10-CM

## 2021-10-21 PROCEDURE — 36415 COLL VENOUS BLD VENIPUNCTURE: CPT

## 2021-10-21 PROCEDURE — 80061 LIPID PANEL: CPT

## 2021-10-21 PROCEDURE — 85025 COMPLETE CBC W/AUTO DIFF WBC: CPT

## 2021-10-21 PROCEDURE — 84443 ASSAY THYROID STIM HORMONE: CPT

## 2021-10-21 PROCEDURE — 83036 HEMOGLOBIN GLYCOSYLATED A1C: CPT

## 2021-10-21 PROCEDURE — 80053 COMPREHEN METABOLIC PANEL: CPT

## 2021-10-22 NOTE — PROGRESS NOTES
Vaughn notified:  See my chart message to patient. The patient's ASCVD 10 year risk score is 0.7. Dear Jewels Ferguson,  Your labs are normal except your lipid panel is elevated and fasting glucose is elevated.   A hemoglobin A1c has been added to exis

## 2021-11-05 ENCOUNTER — PATIENT MESSAGE (OUTPATIENT)
Dept: FAMILY MEDICINE CLINIC | Facility: CLINIC | Age: 46
End: 2021-11-05

## 2021-11-05 NOTE — TELEPHONE ENCOUNTER
From: Roxane Wilson  To: Senia Maddox DO  Sent: 11/5/2021 9:09 AM CDT  Subject: Physical    Can you send the form that I brought with me to my appointment? It just needed my fasting labs added.  They need it by dec 1 for me to get the employee discoun

## 2021-11-05 NOTE — TELEPHONE ENCOUNTER
Forms completed and signed. Faxed to [employer] [facility] at [phone]  Confirmation received.    Form sent to scan  Pt notified via [employer]

## 2021-11-16 ENCOUNTER — HOSPITAL ENCOUNTER (OUTPATIENT)
Dept: MAMMOGRAPHY | Facility: HOSPITAL | Age: 46
Discharge: HOME | End: 2021-11-16
Attending: OBSTETRICS & GYNECOLOGY
Payer: COMMERCIAL

## 2021-11-16 DIAGNOSIS — Z12.31 ENCOUNTER FOR SCREENING MAMMOGRAM FOR MALIGNANT NEOPLASM OF BREAST: ICD-10-CM

## 2021-12-06 ENCOUNTER — TELEPHONE (OUTPATIENT)
Dept: INTERNAL MEDICINE CLINIC | Facility: HOSPITAL | Age: 46
End: 2021-12-06

## 2021-12-06 NOTE — TELEPHONE ENCOUNTER
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 12/7    Did you have close contact with someone on your unit while not wearing a mask? (e.g., during meal breaks):  Yes []   No [x]    If yes, who:   Do you share a workspace? Yes []   No [x]       If yes, with whom?    Do you have any family members ordered: [x] Rapid    [x] [employer]    Date test is to be taken:    12/5-Rapid / PCR at   [x]  Outside testing       [x] Manager notified    INSTRUCTIONS PROVIDED:    [] Employee will schedule testing via [employer] or call Central Scheduling at [phone].

## 2021-12-12 DIAGNOSIS — J01.20 SUBACUTE ETHMOIDAL SINUSITIS: ICD-10-CM

## 2021-12-13 RX ORDER — FLUTICASONE PROPIONATE 50 MCG
SPRAY, SUSPENSION (ML) NASAL
Qty: 48 ML | Refills: 3 | Status: SHIPPED | OUTPATIENT
Start: 2021-12-13

## 2021-12-13 NOTE — TELEPHONE ENCOUNTER
Refill Passed Protocol:     Pt requesting refill of   Requested Prescriptions     Pending Prescriptions Disp Refills   • FLUTICASONE PROPIONATE 50 MCG/ACT Nasal Suspension [Pharmacy Med Name: FLUTICASONE PROP 50 MCG SPRAY] 48 mL 3     Sig: USE 1 SPRAY INTO

## 2021-12-22 ENCOUNTER — NURSE ONLY (OUTPATIENT)
Dept: LAB | Facility: HOSPITAL | Age: 46
End: 2021-12-22
Attending: PREVENTIVE MEDICINE
Payer: COMMERCIAL

## 2021-12-22 ENCOUNTER — TELEPHONE (OUTPATIENT)
Dept: INTERNAL MEDICINE CLINIC | Facility: HOSPITAL | Age: 46
End: 2021-12-22

## 2021-12-22 DIAGNOSIS — Z20.822 SUSPECTED COVID-19 VIRUS INFECTION: Primary | ICD-10-CM

## 2021-12-22 DIAGNOSIS — Z20.822 SUSPECTED COVID-19 VIRUS INFECTION: ICD-10-CM

## 2021-12-22 LAB — SARS-COV-2 RNA RESP QL NAA+PROBE: NOT DETECTED

## 2021-12-22 NOTE — TELEPHONE ENCOUNTER
Department: cath lab                               [x] [facility]  []TONYA   [] [address] [facility]    Dept Manager/Supervisor/team or clinical lead:  Prabha Gamboa    Position:  [] MD     [] RN     [] Respiratory Therapist     [] PCT     [] PSR      [] BHA     [] MA [x] Other - positive yesterday. Exposed 12/21 to Ben Fitzgerald (one of the 2 employees noted above) in breakroom for 30 min. She tested positive yesterday. Previous history of covid: Yes []     No [x]       If yes, when?   Any recent travel: Yes []     No [x]    If y from exposure. If negative may return to work after 7 days from exposure date (on day 8 after exposure).         [] Asymptomatic AND vaccinated or COVID infection in past 3 months: May work and continue to monitor sympt

## 2021-12-25 NOTE — TELEPHONE ENCOUNTER
Results and RTW guidelines:    COVID RESULT:    [x] Viewed by employee in [address] RTW plan and instructions as indicated on triage call. Manager notified.   Estimated RTW date:  12/23/21  [] Discussed with employee   [x]  Sent via [employer] seek emergency care  [x] If new or worsening symptoms develop, stay home and call hotline    Estimated RTW date:  12/23/21    [] The employee voiced understanding of above plan/instructions  [x] Manager Notified

## 2022-01-03 ENCOUNTER — PATIENT MESSAGE (OUTPATIENT)
Dept: FAMILY MEDICINE CLINIC | Facility: CLINIC | Age: 47
End: 2022-01-03

## 2022-01-06 NOTE — TELEPHONE ENCOUNTER
From: Cynthia Son  To: Sharan Carney DO  Sent: 1/3/2022 12:57 PM CST  Subject: Nurtec rx     My insurance sent me a letter stating I need prior authorization sent to them at…  Fax [phone]

## 2022-01-19 ENCOUNTER — TELEPHONE (OUTPATIENT)
Dept: FAMILY MEDICINE CLINIC | Facility: CLINIC | Age: 47
End: 2022-01-19

## 2022-01-19 ENCOUNTER — OFFICE VISIT (OUTPATIENT)
Dept: PODIATRY CLINIC | Facility: CLINIC | Age: 47
End: 2022-01-19
Payer: COMMERCIAL

## 2022-01-19 VITALS — SYSTOLIC BLOOD PRESSURE: 102 MMHG | DIASTOLIC BLOOD PRESSURE: 82 MMHG

## 2022-01-19 DIAGNOSIS — M79.675 GREAT TOE PAIN, LEFT: ICD-10-CM

## 2022-01-19 DIAGNOSIS — L60.8 INCURVATED NAIL: Primary | ICD-10-CM

## 2022-01-19 DIAGNOSIS — M79.674 GREAT TOE PAIN, RIGHT: ICD-10-CM

## 2022-01-19 PROCEDURE — 11750 EXCISION NAIL&NAIL MATRIX: CPT | Performed by: STUDENT IN AN ORGANIZED HEALTH CARE EDUCATION/TRAINING PROGRAM

## 2022-01-19 PROCEDURE — 3074F SYST BP LT 130 MM HG: CPT | Performed by: STUDENT IN AN ORGANIZED HEALTH CARE EDUCATION/TRAINING PROGRAM

## 2022-01-19 PROCEDURE — 3079F DIAST BP 80-89 MM HG: CPT | Performed by: STUDENT IN AN ORGANIZED HEALTH CARE EDUCATION/TRAINING PROGRAM

## 2022-01-19 NOTE — TELEPHONE ENCOUNTER
Prior Auth received from [employer] for Nurtec 75 mg. Forms completed and faxed to listed fax number. Confirmation received.    Sent to scan

## 2022-01-20 NOTE — PATIENT INSTRUCTIONS
- Perform soaking and aftercare as advised. -Seek immediate medical attention if any acute signs of infection or other concerns arise.

## 2022-01-20 NOTE — PROGRESS NOTES
Per Dr Katie Queen  to draw up . 2.5ml of 1% Lidocaine and 2.5ml marcaine 0.5% for a right & left hallux Ingrown Toenail Procedure.

## 2022-01-20 NOTE — PROGRESS NOTES
[address] Podiatry  Progress Note    Robin Sams is a 42year old female. Patient presents with:  Ingrown Toenail: Ingrown toenail bilateral great toe. 7/10 pain at this time. Patient noticed no redness, sweling or drainage. Just very painful. following lumbar puncture 3/2/2013   • History of tobacco use    • Hyperlipidemia    • Hypertriglyceridemia 8/27/2018   • Infertility management     IVF to conceive twins - male factor - retrograde ejaculation   • Kidney stones    • Memory deficit 12/8/201 Adopted: Yes   Family history unknown: Yes      Social History    Socioeconomic History      Marital status:     Tobacco Use      Smoking status: Former Smoker        Packs/day: 0.35        Years: 0.00        Pack years: 0        Quit date: 1/19/ and all orders for this visit:    Incurvated nail    Great toe pain, right    Great toe pain, left        Plan:     -Patient examined, chart history reviewed.   -Discussed etiology of patient's condition and various treatment options.  -Discussed nail matri acute signs of infection advised patient to seek immediate medical attention if any concerns arise. The patient indicates understanding of these issues and agrees to the plan. Return in about 2 weeks (around 2/2/2022) for Nail matrixectomy follow-up.

## 2022-01-24 ENCOUNTER — HOSPITAL ENCOUNTER (OUTPATIENT)
Dept: MAMMOGRAPHY | Facility: HOSPITAL | Age: 47
Discharge: HOME | End: 2022-01-24
Attending: OBSTETRICS & GYNECOLOGY
Payer: COMMERCIAL

## 2022-01-24 PROCEDURE — 77063 BREAST TOMOSYNTHESIS BI: CPT | Performed by: OBSTETRICS & GYNECOLOGY

## 2022-01-24 PROCEDURE — 77067 SCR MAMMO BI INCL CAD: CPT | Performed by: OBSTETRICS & GYNECOLOGY

## 2022-01-25 PROBLEM — R92.2 DENSE BREASTS: Status: ACTIVE | Noted: 2022-01-25

## 2022-01-25 PROBLEM — R92.30 DENSE BREASTS: Status: ACTIVE | Noted: 2022-01-25

## 2022-01-26 ENCOUNTER — PATIENT MESSAGE (OUTPATIENT)
Dept: OBGYN CLINIC | Facility: CLINIC | Age: 47
End: 2022-01-26

## 2022-02-01 ENCOUNTER — PATIENT MESSAGE (OUTPATIENT)
Dept: PODIATRY CLINIC | Facility: CLINIC | Age: 47
End: 2022-02-01

## 2022-02-02 ENCOUNTER — OFFICE VISIT (OUTPATIENT)
Dept: PODIATRY CLINIC | Facility: CLINIC | Age: 47
End: 2022-02-02
Payer: COMMERCIAL

## 2022-02-02 DIAGNOSIS — M79.674 GREAT TOE PAIN, RIGHT: ICD-10-CM

## 2022-02-02 DIAGNOSIS — L60.8 INCURVATED NAIL: Primary | ICD-10-CM

## 2022-02-02 DIAGNOSIS — M79.675 GREAT TOE PAIN, LEFT: ICD-10-CM

## 2022-02-03 NOTE — PATIENT INSTRUCTIONS
-Continue soaks and aftercare for 1 additional week. -Follow-up as needed if any pain or acute signs of infection arise to site.

## 2022-03-29 ENCOUNTER — TELEPHONE (OUTPATIENT)
Dept: OBGYN CLINIC | Facility: CLINIC | Age: 47
End: 2022-03-29

## 2022-03-29 PROBLEM — R10.31 RLQ ABDOMINAL PAIN: Status: ACTIVE | Noted: 2022-03-29

## 2022-03-29 PROBLEM — N76.0 VAGINITIS AND VULVOVAGINITIS: Status: RESOLVED | Noted: 2021-03-31 | Resolved: 2022-03-29

## 2022-03-29 PROBLEM — N92.1 BREAKTHROUGH BLEEDING ON BIRTH CONTROL PILLS: Status: ACTIVE | Noted: 2022-03-29

## 2022-03-29 NOTE — TELEPHONE ENCOUNTER
Can't get in for an ultrasound for a few weeks w/ Rafa. order is for today &  today. Is it necessary to get this ultrasound done?

## 2022-04-10 ENCOUNTER — HOSPITAL ENCOUNTER (OUTPATIENT)
Dept: ULTRASOUND IMAGING | Age: 47
Discharge: HOME | End: 2022-04-10
Attending: OBSTETRICS & GYNECOLOGY
Payer: COMMERCIAL

## 2022-04-10 ENCOUNTER — HOSPITAL ENCOUNTER (OUTPATIENT)
Dept: ULTRASOUND IMAGING | Age: 47
End: 2022-04-10
Attending: OBSTETRICS & GYNECOLOGY
Payer: COMMERCIAL

## 2022-04-10 DIAGNOSIS — N92.1 BREAKTHROUGH BLEEDING ON BIRTH CONTROL PILLS: ICD-10-CM

## 2022-04-10 DIAGNOSIS — N94.6 DYSMENORRHEA: ICD-10-CM

## 2022-04-10 DIAGNOSIS — R10.31 ABDOMINAL PAIN, RIGHT LOWER QUADRANT: ICD-10-CM

## 2022-04-10 PROCEDURE — 76856 US EXAM PELVIC COMPLETE: CPT | Performed by: OBSTETRICS & GYNECOLOGY

## 2022-04-10 PROCEDURE — 76830 TRANSVAGINAL US NON-OB: CPT | Performed by: OBSTETRICS & GYNECOLOGY

## 2022-08-25 ENCOUNTER — PATIENT MESSAGE (OUTPATIENT)
Dept: FAMILY MEDICINE CLINIC | Facility: CLINIC | Age: 47
End: 2022-08-25

## 2022-08-25 DIAGNOSIS — Z12.11 SCREENING FOR COLORECTAL CANCER: Primary | ICD-10-CM

## 2022-08-25 DIAGNOSIS — Z12.12 SCREENING FOR COLORECTAL CANCER: Primary | ICD-10-CM

## 2022-08-26 NOTE — TELEPHONE ENCOUNTER
If there is family history of colon cancer then colonoscopy is preferred. My preference is colonoscopy if it is negative then no repeat testing is required for 10 years. If there is no family history and patient's next year is very busy, may complete FIT testing or consider Cologuard if it is covered by insurance. FIT testing only looks for blood in stool, colonoscopy looks for polyps and if removed polyps prevents  colon cancer, Cologuard looks for abnormal DNA in the stool that is related to cancer and repeated in 3 years. The layman's terms is Cologuard is \"poop in the box\". It is not 100% and colonoscopy remains the gold standard for screening of colon cancer.     Please inform

## 2022-08-29 ENCOUNTER — OFFICE VISIT (OUTPATIENT)
Dept: OBGYN CLINIC | Facility: CLINIC | Age: 47
End: 2022-08-29
Payer: COMMERCIAL

## 2022-08-29 VITALS
HEART RATE: 94 BPM | BODY MASS INDEX: 28.59 KG/M2 | WEIGHT: 141.81 LBS | SYSTOLIC BLOOD PRESSURE: 110 MMHG | HEIGHT: 59 IN | DIASTOLIC BLOOD PRESSURE: 62 MMHG

## 2022-08-29 DIAGNOSIS — Z23 NEED FOR HPV VACCINATION: ICD-10-CM

## 2022-08-29 DIAGNOSIS — R63.8 UNABLE TO LOSE WEIGHT: ICD-10-CM

## 2022-08-29 DIAGNOSIS — Z01.411 ENCOUNTER FOR WELL WOMAN EXAM WITH ABNORMAL FINDINGS: Primary | ICD-10-CM

## 2022-08-29 DIAGNOSIS — Z12.31 ENCOUNTER FOR SCREENING MAMMOGRAM FOR MALIGNANT NEOPLASM OF BREAST: ICD-10-CM

## 2022-08-29 DIAGNOSIS — G43.829 MENSTRUAL MIGRAINE WITHOUT STATUS MIGRAINOSUS, NOT INTRACTABLE: ICD-10-CM

## 2022-08-29 DIAGNOSIS — R92.2 DENSE BREASTS: ICD-10-CM

## 2022-08-29 DIAGNOSIS — Z12.39 SCREENING FOR BREAST CANCER USING NON-MAMMOGRAM MODALITY: ICD-10-CM

## 2022-08-29 DIAGNOSIS — Z79.3 ON ORAL CONTRACEPTIVE PILLS FOR NON-CONTRACEPTION INDICATION: ICD-10-CM

## 2022-08-29 DIAGNOSIS — R19.8 ABNORMAL DEFECATION: ICD-10-CM

## 2022-08-29 DIAGNOSIS — K59.09 CHRONIC CONSTIPATION: ICD-10-CM

## 2022-09-16 DIAGNOSIS — Z30.09 ENCOUNTER FOR OTHER GENERAL COUNSELING AND ADVICE ON CONTRACEPTION: ICD-10-CM

## 2022-09-20 ENCOUNTER — TELEPHONE (OUTPATIENT)
Dept: OBGYN CLINIC | Facility: CLINIC | Age: 47
End: 2022-09-20

## 2022-09-20 ENCOUNTER — OFFICE VISIT (OUTPATIENT)
Dept: FAMILY MEDICINE CLINIC | Facility: CLINIC | Age: 47
End: 2022-09-20

## 2022-09-20 VITALS
OXYGEN SATURATION: 98 % | HEIGHT: 58.27 IN | RESPIRATION RATE: 16 BRPM | DIASTOLIC BLOOD PRESSURE: 64 MMHG | BODY MASS INDEX: 29.45 KG/M2 | TEMPERATURE: 97 F | WEIGHT: 142.19 LBS | HEART RATE: 98 BPM | SYSTOLIC BLOOD PRESSURE: 108 MMHG

## 2022-09-20 DIAGNOSIS — Z13.29 SCREENING FOR ENDOCRINE, NUTRITIONAL, METABOLIC AND IMMUNITY DISORDER: ICD-10-CM

## 2022-09-20 DIAGNOSIS — Z13.21 SCREENING FOR ENDOCRINE, NUTRITIONAL, METABOLIC AND IMMUNITY DISORDER: ICD-10-CM

## 2022-09-20 DIAGNOSIS — Z13.228 SCREENING FOR ENDOCRINE, NUTRITIONAL, METABOLIC AND IMMUNITY DISORDER: ICD-10-CM

## 2022-09-20 DIAGNOSIS — Z12.4 SCREENING FOR CERVICAL CANCER: ICD-10-CM

## 2022-09-20 DIAGNOSIS — Z86.79 HISTORY OF PSVT (PAROXYSMAL SUPRAVENTRICULAR TACHYCARDIA): ICD-10-CM

## 2022-09-20 DIAGNOSIS — E78.2 MIXED HYPERLIPIDEMIA: ICD-10-CM

## 2022-09-20 DIAGNOSIS — Z23 NEED FOR VACCINATION: ICD-10-CM

## 2022-09-20 DIAGNOSIS — Z13.0 SCREENING FOR ENDOCRINE, NUTRITIONAL, METABOLIC AND IMMUNITY DISORDER: ICD-10-CM

## 2022-09-20 DIAGNOSIS — R87.610 ASCUS WITH POSITIVE HIGH RISK HPV CERVICAL: ICD-10-CM

## 2022-09-20 DIAGNOSIS — Z12.31 ENCOUNTER FOR SCREENING MAMMOGRAM FOR MALIGNANT NEOPLASM OF BREAST: ICD-10-CM

## 2022-09-20 DIAGNOSIS — G43.809 OTHER MIGRAINE WITHOUT STATUS MIGRAINOSUS, NOT INTRACTABLE: ICD-10-CM

## 2022-09-20 DIAGNOSIS — R87.810 ASCUS WITH POSITIVE HIGH RISK HPV CERVICAL: ICD-10-CM

## 2022-09-20 DIAGNOSIS — Z12.11 SCREENING FOR COLON CANCER: ICD-10-CM

## 2022-09-20 DIAGNOSIS — Z00.00 ANNUAL PHYSICAL EXAM: Primary | ICD-10-CM

## 2022-09-20 DIAGNOSIS — R92.2 DENSE BREASTS: ICD-10-CM

## 2022-09-20 PROBLEM — Z79.3 ON ORAL CONTRACEPTIVE PILLS FOR NON-CONTRACEPTION INDICATION: Status: RESOLVED | Noted: 2022-08-29 | Resolved: 2022-09-20

## 2022-09-20 PROBLEM — R29.898 TRANSIENT RIGHT LEG WEAKNESS: Status: RESOLVED | Noted: 2018-12-20 | Resolved: 2022-09-20

## 2022-09-20 PROBLEM — H65.21 RIGHT CHRONIC SEROUS OTITIS MEDIA: Status: RESOLVED | Noted: 2018-12-20 | Resolved: 2022-09-20

## 2022-09-20 PROBLEM — H05.821: Status: RESOLVED | Noted: 2018-12-20 | Resolved: 2022-09-20

## 2022-09-20 PROBLEM — R29.898 TRANSIENT WEAKNESS OF LEFT LEG: Status: RESOLVED | Noted: 2018-12-20 | Resolved: 2022-09-20

## 2022-09-20 PROBLEM — R10.31 RLQ ABDOMINAL PAIN: Status: RESOLVED | Noted: 2022-03-29 | Resolved: 2022-09-20

## 2022-09-20 PROBLEM — R19.8 ABNORMAL DEFECATION: Status: RESOLVED | Noted: 2022-08-29 | Resolved: 2022-09-20

## 2022-09-20 PROBLEM — IMO0002 INCREASE IN CREATININE: Status: RESOLVED | Noted: 2020-09-13 | Resolved: 2022-09-20

## 2022-09-20 PROBLEM — R63.8 UNABLE TO LOSE WEIGHT: Status: RESOLVED | Noted: 2022-08-29 | Resolved: 2022-09-20

## 2022-09-20 PROBLEM — R79.89 INCREASE IN CREATININE: Status: RESOLVED | Noted: 2020-09-13 | Resolved: 2022-09-20

## 2022-09-20 PROBLEM — Z01.411 ENCOUNTER FOR WELL WOMAN EXAM WITH ABNORMAL FINDINGS: Status: RESOLVED | Noted: 2021-08-28 | Resolved: 2022-09-20

## 2022-09-20 PROCEDURE — 3008F BODY MASS INDEX DOCD: CPT | Performed by: FAMILY MEDICINE

## 2022-09-20 PROCEDURE — 90471 IMMUNIZATION ADMIN: CPT | Performed by: FAMILY MEDICINE

## 2022-09-20 PROCEDURE — 90632 HEPA VACCINE ADULT IM: CPT | Performed by: FAMILY MEDICINE

## 2022-09-20 PROCEDURE — 99396 PREV VISIT EST AGE 40-64: CPT | Performed by: FAMILY MEDICINE

## 2022-09-20 PROCEDURE — 3078F DIAST BP <80 MM HG: CPT | Performed by: FAMILY MEDICINE

## 2022-09-20 PROCEDURE — 3074F SYST BP LT 130 MM HG: CPT | Performed by: FAMILY MEDICINE

## 2022-09-20 PROCEDURE — 90715 TDAP VACCINE 7 YRS/> IM: CPT | Performed by: FAMILY MEDICINE

## 2022-09-20 PROCEDURE — 90472 IMMUNIZATION ADMIN EACH ADD: CPT | Performed by: FAMILY MEDICINE

## 2022-09-20 RX ORDER — RIMEGEPANT SULFATE 75 MG/75MG
75 TABLET, ORALLY DISINTEGRATING ORAL EVERY OTHER DAY
Qty: 15 TABLET | Refills: 3 | Status: SHIPPED | OUTPATIENT
Start: 2022-09-20 | End: 2022-10-20

## 2022-09-20 NOTE — TELEPHONE ENCOUNTER
Patient would like to consider IUD after speaking with her PCP who will manage her migraines if pt has an IUD placed. Appt offered and accepted; scheduled 9/26/22 for consult and possible IUD placement. Pt will call the office or cancel vis MyChart if unable to make the appt.

## 2022-09-23 ENCOUNTER — PATIENT MESSAGE (OUTPATIENT)
Dept: FAMILY MEDICINE CLINIC | Facility: CLINIC | Age: 47
End: 2022-09-23

## 2022-09-23 ENCOUNTER — LAB ENCOUNTER (OUTPATIENT)
Dept: LAB | Facility: HOSPITAL | Age: 47
End: 2022-09-23
Attending: FAMILY MEDICINE

## 2022-09-23 ENCOUNTER — TELEPHONE (OUTPATIENT)
Dept: FAMILY MEDICINE CLINIC | Facility: CLINIC | Age: 47
End: 2022-09-23

## 2022-09-23 DIAGNOSIS — Z13.228 SCREENING FOR ENDOCRINE, NUTRITIONAL, METABOLIC AND IMMUNITY DISORDER: ICD-10-CM

## 2022-09-23 DIAGNOSIS — E78.2 MIXED HYPERLIPIDEMIA: ICD-10-CM

## 2022-09-23 DIAGNOSIS — Z13.29 SCREENING FOR ENDOCRINE, NUTRITIONAL, METABOLIC AND IMMUNITY DISORDER: ICD-10-CM

## 2022-09-23 DIAGNOSIS — R73.01 ELEVATED FASTING BLOOD SUGAR: Primary | ICD-10-CM

## 2022-09-23 DIAGNOSIS — Z13.0 SCREENING FOR ENDOCRINE, NUTRITIONAL, METABOLIC AND IMMUNITY DISORDER: ICD-10-CM

## 2022-09-23 DIAGNOSIS — R73.01 ELEVATED FASTING BLOOD SUGAR: ICD-10-CM

## 2022-09-23 DIAGNOSIS — Z13.21 SCREENING FOR ENDOCRINE, NUTRITIONAL, METABOLIC AND IMMUNITY DISORDER: ICD-10-CM

## 2022-09-23 LAB
ALBUMIN SERPL-MCNC: 4.2 G/DL (ref 3.4–5)
ALBUMIN/GLOB SERPL: 1.2 {RATIO} (ref 1–2)
ALP LIVER SERPL-CCNC: 54 U/L
ALT SERPL-CCNC: 28 U/L
ANION GAP SERPL CALC-SCNC: 8 MMOL/L (ref 0–18)
AST SERPL-CCNC: 34 U/L (ref 15–37)
BASOPHILS # BLD AUTO: 0.07 X10(3) UL (ref 0–0.2)
BASOPHILS NFR BLD AUTO: 1 %
BILIRUB SERPL-MCNC: 0.7 MG/DL (ref 0.1–2)
BUN BLD-MCNC: 15 MG/DL (ref 7–18)
CALCIUM BLD-MCNC: 9.9 MG/DL (ref 8.5–10.1)
CHLORIDE SERPL-SCNC: 108 MMOL/L (ref 98–112)
CHOLEST SERPL-MCNC: 212 MG/DL (ref ?–200)
CO2 SERPL-SCNC: 23 MMOL/L (ref 21–32)
CREAT BLD-MCNC: 0.99 MG/DL
EOSINOPHIL # BLD AUTO: 0.15 X10(3) UL (ref 0–0.7)
EOSINOPHIL NFR BLD AUTO: 2.2 %
ERYTHROCYTE [DISTWIDTH] IN BLOOD BY AUTOMATED COUNT: 11.9 %
FASTING PATIENT LIPID ANSWER: YES
FASTING STATUS PATIENT QL REPORTED: YES
GFR SERPLBLD BASED ON 1.73 SQ M-ARVRAT: 72 ML/MIN/1.73M2 (ref 60–?)
GLOBULIN PLAS-MCNC: 3.4 G/DL (ref 2.8–4.4)
GLUCOSE BLD-MCNC: 106 MG/DL (ref 70–99)
HCT VFR BLD AUTO: 43.3 %
HDLC SERPL-MCNC: 40 MG/DL (ref 40–59)
HGB BLD-MCNC: 15.5 G/DL
IMM GRANULOCYTES # BLD AUTO: 0.02 X10(3) UL (ref 0–1)
IMM GRANULOCYTES NFR BLD: 0.3 %
LDLC SERPL CALC-MCNC: 142 MG/DL (ref ?–100)
LYMPHOCYTES # BLD AUTO: 2.06 X10(3) UL (ref 1–4)
LYMPHOCYTES NFR BLD AUTO: 30.3 %
MCH RBC QN AUTO: 32.7 PG (ref 26–34)
MCHC RBC AUTO-ENTMCNC: 35.8 G/DL (ref 31–37)
MCV RBC AUTO: 91.4 FL
MONOCYTES # BLD AUTO: 0.58 X10(3) UL (ref 0.1–1)
MONOCYTES NFR BLD AUTO: 8.5 %
NEUTROPHILS # BLD AUTO: 3.91 X10 (3) UL (ref 1.5–7.7)
NEUTROPHILS # BLD AUTO: 3.91 X10(3) UL (ref 1.5–7.7)
NEUTROPHILS NFR BLD AUTO: 57.7 %
NONHDLC SERPL-MCNC: 172 MG/DL (ref ?–130)
OSMOLALITY SERPL CALC.SUM OF ELEC: 289 MOSM/KG (ref 275–295)
PLATELET # BLD AUTO: 212 10(3)UL (ref 150–450)
POTASSIUM SERPL-SCNC: 4.2 MMOL/L (ref 3.5–5.1)
PROT SERPL-MCNC: 7.6 G/DL (ref 6.4–8.2)
RBC # BLD AUTO: 4.74 X10(6)UL
SODIUM SERPL-SCNC: 139 MMOL/L (ref 136–145)
TRIGL SERPL-MCNC: 164 MG/DL (ref 30–149)
TSI SER-ACNC: 2.81 MIU/ML (ref 0.36–3.74)
VLDLC SERPL CALC-MCNC: 31 MG/DL (ref 0–30)
WBC # BLD AUTO: 6.8 X10(3) UL (ref 4–11)

## 2022-09-23 PROCEDURE — 84443 ASSAY THYROID STIM HORMONE: CPT

## 2022-09-23 PROCEDURE — 80061 LIPID PANEL: CPT

## 2022-09-23 PROCEDURE — 85025 COMPLETE CBC W/AUTO DIFF WBC: CPT

## 2022-09-23 PROCEDURE — 80053 COMPREHEN METABOLIC PANEL: CPT

## 2022-09-23 PROCEDURE — 83036 HEMOGLOBIN GLYCOSYLATED A1C: CPT

## 2022-09-23 PROCEDURE — 36415 COLL VENOUS BLD VENIPUNCTURE: CPT

## 2022-09-23 NOTE — TELEPHONE ENCOUNTER
From: Ramiro Castillo  Sent: 9/23/2022 1:53 PM CDT  To: [facility] Clinical Staff  Subject: A1c     Thanks! Can you ask dr Chad Luevano about the ozempic her and I discussed. I know she said if my a1c is high insurance will cover but between my obese bmi and my lipids and elevated glucose if insurance will cover?     Thanks

## 2022-09-26 ENCOUNTER — TELEPHONE (OUTPATIENT)
Dept: FAMILY MEDICINE CLINIC | Facility: CLINIC | Age: 47
End: 2022-09-26

## 2022-09-26 ENCOUNTER — OFFICE VISIT (OUTPATIENT)
Dept: OBGYN CLINIC | Facility: CLINIC | Age: 47
End: 2022-09-26

## 2022-09-26 VITALS
HEART RATE: 98 BPM | HEIGHT: 58.27 IN | WEIGHT: 142.81 LBS | BODY MASS INDEX: 29.57 KG/M2 | SYSTOLIC BLOOD PRESSURE: 120 MMHG | DIASTOLIC BLOOD PRESSURE: 78 MMHG

## 2022-09-26 DIAGNOSIS — Z30.430 ENCOUNTER FOR INSERTION OF INTRAUTERINE CONTRACEPTIVE DEVICE (IUD): Primary | ICD-10-CM

## 2022-09-26 DIAGNOSIS — R63.8 UNABLE TO LOSE WEIGHT: ICD-10-CM

## 2022-09-26 DIAGNOSIS — G43.829 MENSTRUAL MIGRAINE WITHOUT STATUS MIGRAINOSUS, NOT INTRACTABLE: ICD-10-CM

## 2022-09-26 DIAGNOSIS — Z30.09 GENERAL COUNSELING AND ADVICE FOR CONTRACEPTIVE MANAGEMENT: ICD-10-CM

## 2022-09-26 DIAGNOSIS — R73.01 ELEVATED FASTING GLUCOSE: ICD-10-CM

## 2022-09-26 LAB
CONTROL LINE PRESENT WITH A CLEAR BACKGROUND (YES/NO): YES YES/NO
EST. AVERAGE GLUCOSE BLD GHB EST-MCNC: 91 MG/DL (ref 68–126)
HBA1C MFR BLD: 4.8 % (ref ?–5.7)
PREGNANCY TEST, URINE: NEGATIVE

## 2022-09-26 NOTE — PROCEDURES
2022    Procedure: Intrauterine device insertion - Mirena    Date of Procedure: 22    Pre-procedure diagnosis:  Encounter for IUD insertion     Post-procedure diagnosis:   Encounter for IUD insertion     Indications:   41year old female  who presents for intrauterine device insertion for contraception. Patient's last menstrual period was 2022 (exact date). Procedure details: The patient was counseled on various methods of contraception. The patient requested long acting reversible contraception with an intrauterine device. The procedure, risks, benefits and alternatives were discussed with the patient. The patient was informed of risks including but not limited to the risk of bleeding, infection, injury, improper placement, pregnancy and irregular bleeding. All questions and concerns were addressed. The patient provided verbal and written consent. The patient was placed in supine position with legs position in stirrups. Bimanual exam performed. Uterus very anteverted. A speculum was placed in the vagina and the cervix was visualized. The cervix was cleaned and prepped with betadine. A single tooth tenaculum was placed on the anterior lip of the cervix. The uterus was sounded to 9 cm. The IUD was then prepped and loaded in sterile fashion. The IUD was then inserted through the cervical canal into the uterine cavity and deployed. The strings were visualized at the external os and cut to 3 cm. The single tooth tenaculum was removed. Bleeding noted at the tenaculum site. Pressure was applied. Good hemostasis was then noted. The IUD strings were then tucked behind the cervix and the all instruments were removed from the vagina. The patient tolerated the procedure well. She was counseled on the recommendation for back up method of contraception for 7 days after placement. The patient verbalized understanding.      Disposition: Stable    Complications: None    Follow up: 4 weeks for IUD string check.      Ariana Hawkins MD  [facility] - OB/GYN

## 2022-09-27 NOTE — PROGRESS NOTES
Vaughn notified:  See my chart message to patient. The patient's ASCVD 10 year risk score is 1.3. Dear Ignacia Courser,  Your labs are normal except your lipid panel and fasting blood sugar are elevated. Hemoglobin A1c is normal so you are not prediabetic. 10-year risk of heart attack per ASCVD score is 1.3, which is low. Treatment for mild to moderate abnormal cholesterol and lipids is best managed  initially with lifestyle changes, improving diet and increasing physical activity. Recommendations for exercise are 3-5 times weekly for 30-60 minutes for a minimum of 150-300 minutes. This will improve your cholesterol levels and strengthen your heart. If you are overweight, then losing weight may also improve your lipid profile. The Mediterranean diet is recommended and contains foods high in omega-3's and alpha linoleic acid; this helps improve your good cholesterol and may lower bad cholesterol. Eat salmon 2x weekly, consume moderate vegetables,lean meats/fish, nuts and healthy fats i.e. almonds, walnuts, flaxseed, hempseed, avocados, avocado or olive oil, spinach, green beans, organic strawberries, etc. Please avoid fried food or limit to 1 x monthly along with pizza and other foods that are high in saturated animal fats. Monitoring your cholesterol and lipid profile is recommended annually sooner as as directed by your doctor. Please call our office  if you have any further question or schedule an appointment.     Sincerely,  Olga Salcido

## 2022-09-27 NOTE — TELEPHONE ENCOUNTER
Please have patient schedule a video visit or in person visit to discuss weight loss and possibly starting Ozempic. This can be used off label but will require an appointment.   Need to go over risks and benefits of medication etc.    Thank you

## 2022-09-29 NOTE — TELEPHONE ENCOUNTER
Incoming call from pt requesting providers thoughts on possibly prescribing the [address]. States her A1c is WNL but she does have an elevated BMI and hyperlipidemia and wondering if that would be justification for use of Ozempic.      Please review and advice

## 2022-09-29 NOTE — TELEPHONE ENCOUNTER
Please have her schedule an office visit to discuss weight loss. We can discuss starting Ozempic. Semaglutide has FDA approval for weight loss.   Please advise

## 2022-10-10 ENCOUNTER — TELEPHONE (OUTPATIENT)
Dept: INTERNAL MEDICINE CLINIC | Facility: HOSPITAL | Age: 47
End: 2022-10-10

## 2022-10-10 NOTE — TELEPHONE ENCOUNTER
10/10 Submitted online dashboard form reporting exposure on 10/5 to patient who test positive for Covid. Asymptomatic and fully vaccinated and boosted so no testing required at this time.

## 2022-10-11 ENCOUNTER — OFFICE VISIT (OUTPATIENT)
Dept: FAMILY MEDICINE CLINIC | Facility: CLINIC | Age: 47
End: 2022-10-11
Payer: COMMERCIAL

## 2022-10-11 VITALS
TEMPERATURE: 98 F | OXYGEN SATURATION: 99 % | BODY MASS INDEX: 29.98 KG/M2 | WEIGHT: 142.81 LBS | SYSTOLIC BLOOD PRESSURE: 118 MMHG | HEIGHT: 58 IN | DIASTOLIC BLOOD PRESSURE: 76 MMHG | HEART RATE: 75 BPM

## 2022-10-11 DIAGNOSIS — E78.2 MIXED HYPERLIPIDEMIA: ICD-10-CM

## 2022-10-11 DIAGNOSIS — Z23 NEED FOR VACCINATION: ICD-10-CM

## 2022-10-11 DIAGNOSIS — E88.81 METABOLIC SYNDROME: ICD-10-CM

## 2022-10-11 DIAGNOSIS — R73.01 ELEVATED FASTING GLUCOSE: ICD-10-CM

## 2022-10-11 PROBLEM — E88.810 METABOLIC SYNDROME: Status: ACTIVE | Noted: 2022-10-11

## 2022-10-11 PROCEDURE — 3008F BODY MASS INDEX DOCD: CPT | Performed by: FAMILY MEDICINE

## 2022-10-11 PROCEDURE — 3078F DIAST BP <80 MM HG: CPT | Performed by: FAMILY MEDICINE

## 2022-10-11 PROCEDURE — 90686 IIV4 VACC NO PRSV 0.5 ML IM: CPT | Performed by: FAMILY MEDICINE

## 2022-10-11 PROCEDURE — 3074F SYST BP LT 130 MM HG: CPT | Performed by: FAMILY MEDICINE

## 2022-10-11 PROCEDURE — 90471 IMMUNIZATION ADMIN: CPT | Performed by: FAMILY MEDICINE

## 2022-10-11 PROCEDURE — 99214 OFFICE O/P EST MOD 30 MIN: CPT | Performed by: FAMILY MEDICINE

## 2022-10-11 RX ORDER — TIRZEPATIDE 5 MG/.5ML
5 INJECTION, SOLUTION SUBCUTANEOUS WEEKLY
Qty: 2 ML | Refills: 1 | Status: SHIPPED | OUTPATIENT
Start: 2022-10-11 | End: 2022-10-11

## 2022-10-11 RX ORDER — TIRZEPATIDE 5 MG/.5ML
5 INJECTION, SOLUTION SUBCUTANEOUS WEEKLY
Qty: 2 ML | Refills: 1 | Status: SHIPPED | OUTPATIENT
Start: 2022-11-12

## 2022-10-11 RX ORDER — TIRZEPATIDE 2.5 MG/.5ML
2.5 INJECTION, SOLUTION SUBCUTANEOUS WEEKLY
Qty: 2 ML | Refills: 0 | Status: SHIPPED | OUTPATIENT
Start: 2022-10-11 | End: 2022-11-11

## 2022-10-20 ENCOUNTER — PATIENT MESSAGE (OUTPATIENT)
Dept: FAMILY MEDICINE CLINIC | Facility: CLINIC | Age: 47
End: 2022-10-20

## 2022-11-03 ENCOUNTER — PATIENT MESSAGE (OUTPATIENT)
Dept: FAMILY MEDICINE CLINIC | Facility: CLINIC | Age: 47
End: 2022-11-03

## 2022-11-04 RX ORDER — TIRZEPATIDE 5 MG/.5ML
5 INJECTION, SOLUTION SUBCUTANEOUS WEEKLY
Qty: 2 ML | Refills: 1 | Status: CANCELLED
Start: 2022-11-08

## 2022-11-04 RX ORDER — TIRZEPATIDE 5 MG/.5ML
5 INJECTION, SOLUTION SUBCUTANEOUS WEEKLY
Qty: 2 ML | Refills: 1 | Status: SHIPPED | OUTPATIENT
Start: 2022-11-08

## 2022-11-04 NOTE — TELEPHONE ENCOUNTER
Pt is due to start 5.0 mg of Mounjaro 11/08/2022 (week 5)     Current Rx is dated to start 11/12/2022. New Rx pended for start date 11/08/2022, sent to provider for review and signature   Please see refill encounter 11/04/2022, unable to pend medication in this encounter since encounter has been previously closed.

## 2022-11-04 NOTE — TELEPHONE ENCOUNTER
Pt is due to start 5.0 mg of Mounjaro 11/08/2022 (week 5)     Current Rx is dated to start 11/12/2022.      New Rx pended for start date 11/08/2022 to prevent delays in medication refill , sent to provider for review and signature

## 2022-11-15 ENCOUNTER — TELEPHONE (OUTPATIENT)
Dept: FAMILY MEDICINE CLINIC | Facility: CLINIC | Age: 47
End: 2022-11-15

## 2022-11-15 ENCOUNTER — OFFICE VISIT (OUTPATIENT)
Dept: FAMILY MEDICINE CLINIC | Facility: CLINIC | Age: 47
End: 2022-11-15
Payer: COMMERCIAL

## 2022-11-15 VITALS
HEART RATE: 119 BPM | SYSTOLIC BLOOD PRESSURE: 124 MMHG | RESPIRATION RATE: 18 BRPM | DIASTOLIC BLOOD PRESSURE: 72 MMHG | TEMPERATURE: 99 F | OXYGEN SATURATION: 98 %

## 2022-11-15 DIAGNOSIS — R39.9 UTI SYMPTOMS: Primary | ICD-10-CM

## 2022-11-15 PROCEDURE — 3078F DIAST BP <80 MM HG: CPT | Performed by: NURSE PRACTITIONER

## 2022-11-15 PROCEDURE — 99213 OFFICE O/P EST LOW 20 MIN: CPT | Performed by: NURSE PRACTITIONER

## 2022-11-15 PROCEDURE — 3074F SYST BP LT 130 MM HG: CPT | Performed by: NURSE PRACTITIONER

## 2022-11-15 PROCEDURE — 87086 URINE CULTURE/COLONY COUNT: CPT | Performed by: NURSE PRACTITIONER

## 2022-11-15 RX ORDER — NITROFURANTOIN 25; 75 MG/1; MG/1
100 CAPSULE ORAL 2 TIMES DAILY
Qty: 10 CAPSULE | Refills: 0 | Status: SHIPPED | OUTPATIENT
Start: 2022-11-15 | End: 2022-11-20

## 2022-11-15 NOTE — TELEPHONE ENCOUNTER
Pt has been having what feels like bladder spasms since Monday, has loree with urination and cramping. Symptoms alleviate when taking AZO. Pt discontinued Azo since she has an upcoming appointment for IUD check and also did not want UA or culture to be affected. Pt was given 5 pm apt slot for today but may not be able to attend due to work. Advised if unable to keep apt may also seek care at [address] [place] [address] without an appointment. Pt Sanjay will call office if she needs to cancel apt.

## 2022-11-15 NOTE — TELEPHONE ENCOUNTER
Pt called office stating she has a really bad UTI and her boss at work could cover her briefly for her to get care. Pt wants to know what she should do.

## 2022-11-16 ENCOUNTER — OFFICE VISIT (OUTPATIENT)
Facility: CLINIC | Age: 47
End: 2022-11-16
Payer: COMMERCIAL

## 2022-11-16 ENCOUNTER — ULTRASOUND ENCOUNTER (OUTPATIENT)
Facility: CLINIC | Age: 47
End: 2022-11-16
Payer: COMMERCIAL

## 2022-11-16 VITALS
SYSTOLIC BLOOD PRESSURE: 110 MMHG | DIASTOLIC BLOOD PRESSURE: 62 MMHG | WEIGHT: 132 LBS | HEART RATE: 99 BPM | BODY MASS INDEX: 28 KG/M2

## 2022-11-16 DIAGNOSIS — T83.32XA INTRAUTERINE CONTRACEPTIVE DEVICE THREADS LOST, INITIAL ENCOUNTER: Primary | ICD-10-CM

## 2022-11-16 DIAGNOSIS — Z23 IMMUNIZATION DUE: ICD-10-CM

## 2022-11-16 PROCEDURE — 99213 OFFICE O/P EST LOW 20 MIN: CPT

## 2022-11-16 PROCEDURE — 90471 IMMUNIZATION ADMIN: CPT

## 2022-11-16 PROCEDURE — 3074F SYST BP LT 130 MM HG: CPT

## 2022-11-16 PROCEDURE — 90651 9VHPV VACCINE 2/3 DOSE IM: CPT

## 2022-11-16 PROCEDURE — 3078F DIAST BP <80 MM HG: CPT

## 2022-12-23 ENCOUNTER — TELEPHONE (OUTPATIENT)
Dept: INTERNAL MEDICINE CLINIC | Facility: HOSPITAL | Age: 47
End: 2022-12-23

## 2022-12-23 DIAGNOSIS — Z20.822 EXPOSURE TO COVID-19 VIRUS: Primary | ICD-10-CM

## 2022-12-24 ENCOUNTER — LAB ENCOUNTER (OUTPATIENT)
Dept: LAB | Age: 47
End: 2022-12-24
Attending: PREVENTIVE MEDICINE
Payer: COMMERCIAL

## 2022-12-24 DIAGNOSIS — Z20.822 EXPOSURE TO COVID-19 VIRUS: ICD-10-CM

## 2022-12-25 LAB — SARS-COV-2 RNA RESP QL NAA+PROBE: NOT DETECTED

## 2022-12-25 NOTE — TELEPHONE ENCOUNTER
Results and RTW guidelines:    COVID RESULT:    [x] Viewed by employee in [address] RTW plan and instructions as indicated on triage call. Manager notified. Estimated RTW date:   [] Discussed with employee   [x] Unable to reach by phone. Sent via [employer] message      Test type:    [] Rapid         [x] Alinity         [] Outside test:       [x] NEGATIVE     Ordered Alinity retest?  [x]Yes   [] No (skip to RTW)   Ordered Rapid retest?   []Yes   [x] No (skip to RTW)           Dated to be taken:  12/26/22    If Yes, PLACE ORDER NOW and instruct the following:  -Originally Symptomatic or Now Symptoms:   -RTW when sx improve- fever free for 24 hours w/o medications, Diarrhea/Vomiting for 24 hours w/o medications    -Originally  Asymptomatic  -Asymptomatic AND Vaccinated or Unvaccinated or Prior infection in past 90 days:   -May work and continue to monitor symptoms for the next 14 days.                                         -Rapid test day 2, rapid test day 5 (day 0 - exposure)    [] Positive     - Employee should quarantine at home for at least 5 days (day 1 is day after sx onset) , follow the CDC guidelines for cleaning and                              quarantining; see CDC.gov   -This employee may RTW on day 6 if asymptomatic or mildly symptomatic (with improving symptoms). Call Employee Health on day 5 if unable to return on day 6 after                      symptom onset.    -This employee needs to call Employee Health on day 5 after symptom onset. The employee needs to be cleared by Employee Health. - Monitor symptoms and temperature                 - Notify PCP of result                 - Seek emergent care with worsening symptoms   - If employee is still experiencing severe symptoms on day 5 must make a RTW appt with Employee Health, Employee will not be cleared if:    1. Has consistent cough, shortness of breath or fatigue that restricts your physical activities    2. Is still feeling \"unwell\"    3. Within 15 days of hospitalization for COVID    4. Within 20 days of intubation for COVID    5. Still has a fever, vomiting or diarrhea   - Keep communication open with management about RTW and if symptoms worsen                - If outside testing completed, bring a copy of result to RTW appointment           Notes:     RTW PLAN:    [x]  If COVID positive results, off work minimum of 5 days from positive test or onset of symptoms (day 0)        On day 5, if asymptomatic or mildly symptomatic (with improving symptoms) may return to work day 6          On day 5, if symptomatic, call Employee Health for RTW screening        []  COVID positive result - call Employee Health on day 5 after symptom onset. The employee needs to be cleared by Employee Health to RTW. [] RTW immediately, continue to monitor for sx  [] RTW when sx improve; must be fever free for 24 hours w/o medications, Diarrhea/Vomiting free for 24 hours w/o medications  [] Alinity ordered; continue to monitor sx and call for new/worsening sx.   Discuss RTW guidelines with manager  [x] May continue to work  [] Follow up with PCP  [] Home until further instruction from hotline with Alinity results  INSTRUCTIONS PROVIDED:  [x]  Plan as noted above  []  Length of time to obtain results   []  Quarantine instructions  []  Masking protocol   []  S/S of worsening infection/condition and importance of prompt medical re-evaluation including when to seek emergency care  [] If symptoms develop, stay home and call hotline for rapid test order    Estimated RTW date:    [] The employee voiced understanding of above plan/instructions  [x] Manager Notified

## 2022-12-27 ENCOUNTER — PATIENT MESSAGE (OUTPATIENT)
Dept: FAMILY MEDICINE CLINIC | Facility: CLINIC | Age: 47
End: 2022-12-27

## 2022-12-27 ENCOUNTER — LAB ENCOUNTER (OUTPATIENT)
Dept: LAB | Facility: HOSPITAL | Age: 47
End: 2022-12-27
Attending: PREVENTIVE MEDICINE

## 2022-12-27 DIAGNOSIS — Z20.822 EXPOSURE TO COVID-19 VIRUS: ICD-10-CM

## 2022-12-27 LAB — SARS-COV-2 RNA RESP QL NAA+PROBE: NOT DETECTED

## 2022-12-27 NOTE — TELEPHONE ENCOUNTER
We will discuss if it is necessary to increase to 7.5 mg at the office visit. 2.5 is a titrating dose and 5 mg and is usually for 3 months minimum or sometimes longer. Patient may go ahead and refill the 5 mg and decide if she wants to change her appointment till later in January or keep her appointment and can increase to 7.5 mg at the end of the 4 weeks if indicated. There is no urgency or rush with this I do not want her to be off medication.      Please inform

## 2022-12-28 RX ORDER — TIRZEPATIDE 5 MG/.5ML
5 INJECTION, SOLUTION SUBCUTANEOUS WEEKLY
Qty: 2 ML | Refills: 1 | Status: SHIPPED | OUTPATIENT
Start: 2022-12-28

## 2022-12-28 NOTE — TELEPHONE ENCOUNTER
Results and RTW guidelines:    COVID RESULT:    [x] Viewed by employee in [facility]. RTW plan and instructions as indicated on triage call. Manager notified. Estimated RTW date:   [] Discussed with employee   [] Unable to reach by phone.   Sent via [employer] message      Test type:    [] Rapid         [x] Alinity         [] Outside test:       [x] NEGATIVE     Ordered Alinity retest?  []Yes   [x] No (skip to RTW)   Ordered Rapid retest?   []Yes   [x] No (skip to RTW)

## 2023-01-11 ENCOUNTER — OFFICE VISIT (OUTPATIENT)
Dept: FAMILY MEDICINE CLINIC | Facility: CLINIC | Age: 48
End: 2023-01-11
Payer: COMMERCIAL

## 2023-01-11 VITALS
OXYGEN SATURATION: 98 % | DIASTOLIC BLOOD PRESSURE: 72 MMHG | HEART RATE: 104 BPM | SYSTOLIC BLOOD PRESSURE: 102 MMHG | HEIGHT: 57 IN | WEIGHT: 120.81 LBS | RESPIRATION RATE: 20 BRPM | BODY MASS INDEX: 26.06 KG/M2 | TEMPERATURE: 98 F

## 2023-01-11 DIAGNOSIS — J02.9 SORE THROAT: Primary | ICD-10-CM

## 2023-01-11 LAB
CONTROL LINE PRESENT WITH A CLEAR BACKGROUND (YES/NO): YES YES/NO
KIT EXPIRATION DATE: NORMAL DATE
STREP GRP A CUL-SCR: NEGATIVE

## 2023-01-11 PROCEDURE — 99213 OFFICE O/P EST LOW 20 MIN: CPT

## 2023-01-11 PROCEDURE — 3008F BODY MASS INDEX DOCD: CPT

## 2023-01-11 PROCEDURE — 3074F SYST BP LT 130 MM HG: CPT

## 2023-01-11 PROCEDURE — 3078F DIAST BP <80 MM HG: CPT

## 2023-01-11 PROCEDURE — 87880 STREP A ASSAY W/OPTIC: CPT

## 2023-01-11 RX ORDER — BENZONATATE 200 MG/1
200 CAPSULE ORAL 3 TIMES DAILY PRN
Qty: 30 CAPSULE | Refills: 0 | Status: SHIPPED | OUTPATIENT
Start: 2023-01-11

## 2023-01-13 ENCOUNTER — TELEPHONE (OUTPATIENT)
Dept: FAMILY MEDICINE CLINIC | Facility: CLINIC | Age: 48
End: 2023-01-13

## 2023-01-13 DIAGNOSIS — J02.9 SORE THROAT: ICD-10-CM

## 2023-01-13 DIAGNOSIS — R05.9 COUGH, UNSPECIFIED TYPE: ICD-10-CM

## 2023-01-13 DIAGNOSIS — Z02.9 ENCOUNTERS FOR ADMINISTRATIVE PURPOSE: Primary | ICD-10-CM

## 2023-01-13 NOTE — TELEPHONE ENCOUNTER
Patient reached out to [facility] regarding symptoms. Fax received and called back patient. States sore throat and cough is worsening, mucus and throat pain increased since seen at [facility] 2 days ago. States provider said antibiotic would be on file if she needed it. Chart reviewed, no other treatment on file at this time other than tessalon. OTC medications discussed. Explained can touch base with provider who saw her if was discussed antibiotic was to be on hold. Patient states she may go to another clinic for further assistance.

## 2023-02-14 ENCOUNTER — OFFICE VISIT (OUTPATIENT)
Dept: FAMILY MEDICINE CLINIC | Facility: CLINIC | Age: 48
End: 2023-02-14
Payer: COMMERCIAL

## 2023-02-14 VITALS
OXYGEN SATURATION: 98 % | RESPIRATION RATE: 16 BRPM | HEIGHT: 57 IN | SYSTOLIC BLOOD PRESSURE: 100 MMHG | BODY MASS INDEX: 25.32 KG/M2 | WEIGHT: 117.38 LBS | HEART RATE: 80 BPM | TEMPERATURE: 97 F | DIASTOLIC BLOOD PRESSURE: 64 MMHG

## 2023-02-14 DIAGNOSIS — R11.0 NAUSEA: ICD-10-CM

## 2023-02-14 DIAGNOSIS — E78.2 MIXED HYPERLIPIDEMIA: ICD-10-CM

## 2023-02-14 DIAGNOSIS — Z12.11 SCREEN FOR COLON CANCER: ICD-10-CM

## 2023-02-14 DIAGNOSIS — R73.01 ELEVATED FASTING GLUCOSE: ICD-10-CM

## 2023-02-14 DIAGNOSIS — E88.81 METABOLIC SYNDROME: Primary | ICD-10-CM

## 2023-02-14 PROBLEM — R73.03 PREDIABETES: Status: ACTIVE | Noted: 2023-02-14

## 2023-02-14 PROCEDURE — 3078F DIAST BP <80 MM HG: CPT | Performed by: FAMILY MEDICINE

## 2023-02-14 PROCEDURE — 3008F BODY MASS INDEX DOCD: CPT | Performed by: FAMILY MEDICINE

## 2023-02-14 PROCEDURE — 99214 OFFICE O/P EST MOD 30 MIN: CPT | Performed by: FAMILY MEDICINE

## 2023-02-14 PROCEDURE — 3074F SYST BP LT 130 MM HG: CPT | Performed by: FAMILY MEDICINE

## 2023-02-14 RX ORDER — AZITHROMYCIN 250 MG/1
TABLET, FILM COATED ORAL AS DIRECTED
COMMUNITY
Start: 2023-01-13 | End: 2023-02-14 | Stop reason: ALTCHOICE

## 2023-02-14 RX ORDER — LEVONORGESTREL 52 MG/1
1 INTRAUTERINE DEVICE INTRAUTERINE ONCE
COMMUNITY

## 2023-02-14 RX ORDER — ONDANSETRON 8 MG/1
8 TABLET, ORALLY DISINTEGRATING ORAL EVERY 8 HOURS PRN
Qty: 30 TABLET | Refills: 0 | Status: SHIPPED | OUTPATIENT
Start: 2023-02-14

## 2023-02-14 RX ORDER — TIRZEPATIDE 7.5 MG/.5ML
7.5 INJECTION, SOLUTION SUBCUTANEOUS WEEKLY
Qty: 2 ML | Refills: 3 | Status: SHIPPED | OUTPATIENT
Start: 2023-02-14

## 2023-02-20 ENCOUNTER — HOSPITAL ENCOUNTER (OUTPATIENT)
Dept: MAMMOGRAPHY | Age: 48
Discharge: HOME | End: 2023-02-20
Attending: OBSTETRICS & GYNECOLOGY
Payer: COMMERCIAL

## 2023-02-20 DIAGNOSIS — Z12.31 ENCOUNTER FOR SCREENING MAMMOGRAM FOR MALIGNANT NEOPLASM OF BREAST: ICD-10-CM

## 2023-02-20 PROCEDURE — 77063 BREAST TOMOSYNTHESIS BI: CPT | Performed by: OBSTETRICS & GYNECOLOGY

## 2023-02-20 PROCEDURE — 77067 SCR MAMMO BI INCL CAD: CPT | Performed by: OBSTETRICS & GYNECOLOGY

## 2023-03-16 ENCOUNTER — NURSE ONLY (OUTPATIENT)
Facility: CLINIC | Age: 48
End: 2023-03-16
Payer: COMMERCIAL

## 2023-03-16 VITALS
SYSTOLIC BLOOD PRESSURE: 118 MMHG | WEIGHT: 114 LBS | BODY MASS INDEX: 24.59 KG/M2 | HEIGHT: 57 IN | DIASTOLIC BLOOD PRESSURE: 62 MMHG | HEART RATE: 96 BPM

## 2023-03-16 PROCEDURE — 3078F DIAST BP <80 MM HG: CPT | Performed by: OBSTETRICS & GYNECOLOGY

## 2023-03-16 PROCEDURE — 90471 IMMUNIZATION ADMIN: CPT | Performed by: OBSTETRICS & GYNECOLOGY

## 2023-03-16 PROCEDURE — 3074F SYST BP LT 130 MM HG: CPT | Performed by: OBSTETRICS & GYNECOLOGY

## 2023-03-16 PROCEDURE — 3008F BODY MASS INDEX DOCD: CPT | Performed by: OBSTETRICS & GYNECOLOGY

## 2023-03-16 PROCEDURE — 90651 9VHPV VACCINE 2/3 DOSE IM: CPT | Performed by: OBSTETRICS & GYNECOLOGY

## 2023-04-05 ENCOUNTER — TELEPHONE (OUTPATIENT)
Dept: INTERNAL MEDICINE CLINIC | Facility: HOSPITAL | Age: 48
End: 2023-04-05

## 2023-04-06 NOTE — TELEPHONE ENCOUNTER
4/6/23 called employee, no answer. Left vm with rtw date and my chart message sent. Advised to call back hotline with questions.

## 2023-05-02 ENCOUNTER — OFFICE VISIT (OUTPATIENT)
Dept: FAMILY MEDICINE CLINIC | Facility: CLINIC | Age: 48
End: 2023-05-02
Payer: COMMERCIAL

## 2023-05-02 VITALS
HEART RATE: 90 BPM | WEIGHT: 110 LBS | BODY MASS INDEX: 24 KG/M2 | OXYGEN SATURATION: 100 % | DIASTOLIC BLOOD PRESSURE: 70 MMHG | SYSTOLIC BLOOD PRESSURE: 116 MMHG | TEMPERATURE: 98 F

## 2023-05-02 DIAGNOSIS — J01.90 ACUTE SINUSITIS WITH SYMPTOMS > 10 DAYS: Primary | ICD-10-CM

## 2023-05-02 DIAGNOSIS — J30.9 ALLERGIC RHINITIS, UNSPECIFIED SEASONALITY, UNSPECIFIED TRIGGER: ICD-10-CM

## 2023-05-02 DIAGNOSIS — Z86.16 HISTORY OF 2019 NOVEL CORONAVIRUS DISEASE (COVID-19): ICD-10-CM

## 2023-05-02 PROCEDURE — 3074F SYST BP LT 130 MM HG: CPT | Performed by: PHYSICIAN ASSISTANT

## 2023-05-02 PROCEDURE — 99213 OFFICE O/P EST LOW 20 MIN: CPT | Performed by: PHYSICIAN ASSISTANT

## 2023-05-02 PROCEDURE — 3078F DIAST BP <80 MM HG: CPT | Performed by: PHYSICIAN ASSISTANT

## 2023-05-02 RX ORDER — DOXYCYCLINE HYCLATE 100 MG/1
100 CAPSULE ORAL 2 TIMES DAILY
Qty: 20 CAPSULE | Refills: 0 | Status: SHIPPED | OUTPATIENT
Start: 2023-05-02 | End: 2023-05-12

## 2023-05-02 RX ORDER — LEVOCETIRIZINE DIHYDROCHLORIDE 5 MG/1
5 TABLET, FILM COATED ORAL EVERY EVENING
COMMUNITY

## 2023-05-02 NOTE — PATIENT INSTRUCTIONS
Doxycycline 100 mg twice daily for 10 days. COntinue Xyzal, resume fluticasone nasal spray.    Encourage fluids, humidifier/vaporizor at bedside, elevate head of bed (sleep with extra pillow), vapor rub to chest, steam therapy if no fever, warm compresses for sinus pressure if no fever, salt water gargles for sore throat, lozenges for sore throat, may try over the counter saline nasal spray or irrigation kit (use distilled water with irrigation kit) for sinus pressure/congestion, get plenty of rest.

## 2023-05-30 ENCOUNTER — OFFICE VISIT (OUTPATIENT)
Dept: FAMILY MEDICINE CLINIC | Facility: CLINIC | Age: 48
End: 2023-05-30
Payer: COMMERCIAL

## 2023-05-30 VITALS
WEIGHT: 109.38 LBS | DIASTOLIC BLOOD PRESSURE: 64 MMHG | HEART RATE: 70 BPM | SYSTOLIC BLOOD PRESSURE: 100 MMHG | TEMPERATURE: 97 F | OXYGEN SATURATION: 98 % | BODY MASS INDEX: 23.6 KG/M2 | HEIGHT: 57 IN | RESPIRATION RATE: 20 BRPM

## 2023-05-30 DIAGNOSIS — E88.81 METABOLIC SYNDROME: Primary | ICD-10-CM

## 2023-05-30 DIAGNOSIS — E78.2 MIXED HYPERLIPIDEMIA: ICD-10-CM

## 2023-05-30 DIAGNOSIS — R73.03 PREDIABETES: ICD-10-CM

## 2023-05-30 DIAGNOSIS — F41.9 ANXIETY: ICD-10-CM

## 2023-05-30 DIAGNOSIS — Z12.11 SCREEN FOR COLON CANCER: ICD-10-CM

## 2023-05-30 LAB
ANION GAP SERPL CALC-SCNC: 5 MMOL/L (ref 0–18)
BUN BLD-MCNC: 18 MG/DL (ref 7–18)
CALCIUM BLD-MCNC: 9.2 MG/DL (ref 8.5–10.1)
CHLORIDE SERPL-SCNC: 109 MMOL/L (ref 98–112)
CHOLEST SERPL-MCNC: 182 MG/DL (ref ?–200)
CO2 SERPL-SCNC: 25 MMOL/L (ref 21–32)
CREAT BLD-MCNC: 0.88 MG/DL
FASTING PATIENT LIPID ANSWER: YES
FASTING STATUS PATIENT QL REPORTED: YES
GFR SERPLBLD BASED ON 1.73 SQ M-ARVRAT: 83 ML/MIN/1.73M2 (ref 60–?)
GLUCOSE BLD-MCNC: 89 MG/DL (ref 70–99)
HDLC SERPL-MCNC: 58 MG/DL (ref 40–59)
LDLC SERPL CALC-MCNC: 105 MG/DL (ref ?–100)
NONHDLC SERPL-MCNC: 124 MG/DL (ref ?–130)
OSMOLALITY SERPL CALC.SUM OF ELEC: 289 MOSM/KG (ref 275–295)
POTASSIUM SERPL-SCNC: 4.4 MMOL/L (ref 3.5–5.1)
SODIUM SERPL-SCNC: 139 MMOL/L (ref 136–145)
TRIGL SERPL-MCNC: 105 MG/DL (ref 30–149)
VLDLC SERPL CALC-MCNC: 18 MG/DL (ref 0–30)

## 2023-05-30 PROCEDURE — 36415 COLL VENOUS BLD VENIPUNCTURE: CPT | Performed by: FAMILY MEDICINE

## 2023-05-30 PROCEDURE — 80061 LIPID PANEL: CPT | Performed by: FAMILY MEDICINE

## 2023-05-30 PROCEDURE — 99214 OFFICE O/P EST MOD 30 MIN: CPT | Performed by: FAMILY MEDICINE

## 2023-05-30 PROCEDURE — 3074F SYST BP LT 130 MM HG: CPT | Performed by: FAMILY MEDICINE

## 2023-05-30 PROCEDURE — 80048 BASIC METABOLIC PNL TOTAL CA: CPT | Performed by: FAMILY MEDICINE

## 2023-05-30 PROCEDURE — 3008F BODY MASS INDEX DOCD: CPT | Performed by: FAMILY MEDICINE

## 2023-05-30 PROCEDURE — 3078F DIAST BP <80 MM HG: CPT | Performed by: FAMILY MEDICINE

## 2023-05-30 RX ORDER — TIRZEPATIDE 5 MG/.5ML
5 INJECTION, SOLUTION SUBCUTANEOUS WEEKLY
Qty: 2 ML | Refills: 1 | Status: SHIPPED | OUTPATIENT
Start: 2023-05-30 | End: 2023-05-30

## 2023-05-30 RX ORDER — TIRZEPATIDE 5 MG/.5ML
5 INJECTION, SOLUTION SUBCUTANEOUS WEEKLY
Qty: 2 ML | Refills: 1 | Status: SHIPPED | OUTPATIENT
Start: 2023-05-30

## 2023-05-30 RX ORDER — SERTRALINE HYDROCHLORIDE 100 MG/1
50 TABLET, FILM COATED ORAL DAILY
Qty: 90 TABLET | Refills: 0 | Status: SHIPPED | OUTPATIENT
Start: 2023-05-30 | End: 2023-05-30

## 2023-05-30 NOTE — PROGRESS NOTES
Patient came in for draw of ordered fasting labs. Patient drawn out of left AC, x 1 attempt and tolerated well. Light green tube drawn.

## 2023-06-01 DIAGNOSIS — G43.809 OTHER MIGRAINE WITHOUT STATUS MIGRAINOSUS, NOT INTRACTABLE: ICD-10-CM

## 2023-06-01 NOTE — PROGRESS NOTES
Vaughn notified:  See my chart message to patient. The patient's ASCVD 10 year risk score is 0.4. Dear Tato Sampson,  Your labs are normal except your lipid panel is minimally elevated. Lipid panel improved significantly. Please keep up your hard work. 10-year risk of heart attack per ASCVD score is 0.4, which is low. Treatment for mild to moderate abnormal cholesterol and lipids is best managed  initially with lifestyle changes, improving diet and increasing physical activity. Recommendations for exercise are 3-5 times weekly for 30-60 minutes for a minimum of 150-300 minutes. This will improve your cholesterol levels and strengthen your heart. If you are overweight, then losing weight may also improve your lipid profile. The Mediterranean diet is recommended and contains foods high in omega-3's and alpha linoleic acid; this helps improve your good cholesterol and may lower bad cholesterol. Eat salmon 2x weekly, consume moderate vegetables,lean meats/fish, nuts and healthy fats i.e. almonds, walnuts, flaxseed, hempseed, avocados, avocado or olive oil, spinach, green beans, organic strawberries, etc. Please avoid fried food or limit to 1 x monthly along with pizza and other foods that are high in saturated animal fats. Monitoring your cholesterol and lipid profile is recommended annually sooner as as directed by your doctor. Please call our office  if you have any further question or schedule an appointment.     Sincerely,  Kajal Samano

## 2023-06-03 RX ORDER — RIMEGEPANT SULFATE 75 MG/75MG
TABLET, ORALLY DISINTEGRATING ORAL
Qty: 16 TABLET | Refills: 3 | Status: SHIPPED | OUTPATIENT
Start: 2023-06-03

## 2023-06-06 DIAGNOSIS — G43.809 OTHER MIGRAINE WITHOUT STATUS MIGRAINOSUS, NOT INTRACTABLE: ICD-10-CM

## 2023-06-06 RX ORDER — SERTRALINE HYDROCHLORIDE 100 MG/1
TABLET, FILM COATED ORAL
COMMUNITY
Start: 2023-05-30 | End: 2023-06-06

## 2023-06-06 RX ORDER — RIMEGEPANT SULFATE 75 MG/75MG
TABLET, ORALLY DISINTEGRATING ORAL
Qty: 16 TABLET | Refills: 3 | OUTPATIENT
Start: 2023-06-06

## 2023-06-07 ENCOUNTER — PATIENT MESSAGE (OUTPATIENT)
Dept: FAMILY MEDICINE CLINIC | Facility: CLINIC | Age: 48
End: 2023-06-07

## 2023-06-07 DIAGNOSIS — G43.809 OTHER MIGRAINE WITHOUT STATUS MIGRAINOSUS, NOT INTRACTABLE: ICD-10-CM

## 2023-06-07 RX ORDER — RIMEGEPANT SULFATE 75 MG/75MG
1 TABLET, ORALLY DISINTEGRATING ORAL EVERY OTHER DAY
Qty: 16 TABLET | Refills: 3 | Status: SHIPPED | OUTPATIENT
Start: 2023-06-07

## 2023-06-09 NOTE — TELEPHONE ENCOUNTER
Pt called stating that her insurance told her that PA for Nurtec was not received. New PA for Nurtec submitted on Covermy meds and approved. ? Your request has been approved  Your request was approved based on the initial information provided at the time of the coverage request submission. Please allow additional time for the final decision to be made and added to the patient's account. Pharmacist informed.

## 2023-06-09 NOTE — TELEPHONE ENCOUNTER
Nurtec 75 mg has been approved from 6/9/2023 and ends 6/9/2024    Case Number: [id number]   ID number: [id number]

## 2023-07-03 DIAGNOSIS — G43.809 OTHER MIGRAINE WITHOUT STATUS MIGRAINOSUS, NOT INTRACTABLE: ICD-10-CM

## 2023-07-03 RX ORDER — RIMEGEPANT SULFATE 75 MG/75MG
1 TABLET, ORALLY DISINTEGRATING ORAL EVERY OTHER DAY
Qty: 16 TABLET | Refills: 3 | Status: SHIPPED | OUTPATIENT
Start: 2023-07-03

## 2023-07-17 ENCOUNTER — PATIENT MESSAGE (OUTPATIENT)
Dept: FAMILY MEDICINE CLINIC | Facility: CLINIC | Age: 48
End: 2023-07-17

## 2023-07-18 NOTE — TELEPHONE ENCOUNTER
Please see my chart message and advise. Pt c/o side effects with sertraline that are affecting intimacy. Pt is asking about other medication options.

## 2023-07-20 NOTE — TELEPHONE ENCOUNTER
Have her decrease the sertraline to 25 mg or half a tablet instead of taking sertraline 50 mg. If this does not resolve the intimacy issues, have her schedule a video visit we will go over some other options but I need to review potential side effects. Patient may schedule a video visit if this is more convenient.       Please inform

## 2023-07-21 NOTE — TELEPHONE ENCOUNTER
Patient should give it 2 weeks. If no improvement then I would schedule a follow-up appointment and we can go through other options and potential side effects and issues with new medication.   Appointment may be virtual.

## 2023-08-31 DIAGNOSIS — F41.9 ANXIETY: ICD-10-CM

## 2023-09-19 ENCOUNTER — TELEPHONE (OUTPATIENT)
Dept: FAMILY MEDICINE CLINIC | Facility: CLINIC | Age: 48
End: 2023-09-19

## 2023-09-19 ENCOUNTER — OFFICE VISIT (OUTPATIENT)
Dept: FAMILY MEDICINE CLINIC | Facility: CLINIC | Age: 48
End: 2023-09-19
Payer: COMMERCIAL

## 2023-09-19 VITALS
OXYGEN SATURATION: 100 % | SYSTOLIC BLOOD PRESSURE: 118 MMHG | HEIGHT: 57.9 IN | HEART RATE: 64 BPM | RESPIRATION RATE: 20 BRPM | BODY MASS INDEX: 24.1 KG/M2 | TEMPERATURE: 98 F | DIASTOLIC BLOOD PRESSURE: 70 MMHG | WEIGHT: 114.81 LBS

## 2023-09-19 DIAGNOSIS — R87.610 ASCUS WITH POSITIVE HIGH RISK HPV CERVICAL: ICD-10-CM

## 2023-09-19 DIAGNOSIS — Z12.11 SCREEN FOR COLON CANCER: ICD-10-CM

## 2023-09-19 DIAGNOSIS — Z13.0 SCREENING FOR ENDOCRINE, NUTRITIONAL, METABOLIC AND IMMUNITY DISORDER: ICD-10-CM

## 2023-09-19 DIAGNOSIS — E78.2 MIXED HYPERLIPIDEMIA: ICD-10-CM

## 2023-09-19 DIAGNOSIS — R73.01 ELEVATED FASTING GLUCOSE: ICD-10-CM

## 2023-09-19 DIAGNOSIS — Z86.79 HISTORY OF PSVT (PAROXYSMAL SUPRAVENTRICULAR TACHYCARDIA): ICD-10-CM

## 2023-09-19 DIAGNOSIS — R73.03 PREDIABETES: ICD-10-CM

## 2023-09-19 DIAGNOSIS — Z13.21 SCREENING FOR ENDOCRINE, NUTRITIONAL, METABOLIC AND IMMUNITY DISORDER: ICD-10-CM

## 2023-09-19 DIAGNOSIS — Z13.29 SCREENING FOR ENDOCRINE, NUTRITIONAL, METABOLIC AND IMMUNITY DISORDER: ICD-10-CM

## 2023-09-19 DIAGNOSIS — Z12.4 SCREENING FOR CERVICAL CANCER: ICD-10-CM

## 2023-09-19 DIAGNOSIS — Z13.228 SCREENING FOR ENDOCRINE, NUTRITIONAL, METABOLIC AND IMMUNITY DISORDER: ICD-10-CM

## 2023-09-19 DIAGNOSIS — G43.809 OTHER MIGRAINE WITHOUT STATUS MIGRAINOSUS, NOT INTRACTABLE: ICD-10-CM

## 2023-09-19 DIAGNOSIS — R87.810 ASCUS WITH POSITIVE HIGH RISK HPV CERVICAL: ICD-10-CM

## 2023-09-19 DIAGNOSIS — Z00.00 ANNUAL PHYSICAL EXAM: Primary | ICD-10-CM

## 2023-09-19 DIAGNOSIS — F41.9 ANXIETY: ICD-10-CM

## 2023-09-19 LAB
ALBUMIN SERPL-MCNC: 3.7 G/DL (ref 3.4–5)
ALBUMIN/GLOB SERPL: 1.1 {RATIO} (ref 1–2)
ALP LIVER SERPL-CCNC: 45 U/L
ALT SERPL-CCNC: 29 U/L
ANION GAP SERPL CALC-SCNC: 7 MMOL/L (ref 0–18)
AST SERPL-CCNC: 26 U/L (ref 15–37)
BASOPHILS # BLD AUTO: 0.06 X10(3) UL (ref 0–0.2)
BASOPHILS NFR BLD AUTO: 0.9 %
BILIRUB SERPL-MCNC: 0.8 MG/DL (ref 0.1–2)
BUN BLD-MCNC: 12 MG/DL (ref 7–18)
CALCIUM BLD-MCNC: 9.3 MG/DL (ref 8.5–10.1)
CHLORIDE SERPL-SCNC: 105 MMOL/L (ref 98–112)
CHOLEST SERPL-MCNC: 195 MG/DL (ref ?–200)
CO2 SERPL-SCNC: 26 MMOL/L (ref 21–32)
CREAT BLD-MCNC: 0.92 MG/DL
EGFRCR SERPLBLD CKD-EPI 2021: 78 ML/MIN/1.73M2 (ref 60–?)
EOSINOPHIL # BLD AUTO: 0.08 X10(3) UL (ref 0–0.7)
EOSINOPHIL NFR BLD AUTO: 1.2 %
ERYTHROCYTE [DISTWIDTH] IN BLOOD BY AUTOMATED COUNT: 11.9 %
EST. AVERAGE GLUCOSE BLD GHB EST-MCNC: 85 MG/DL (ref 68–126)
FASTING PATIENT LIPID ANSWER: YES
FASTING STATUS PATIENT QL REPORTED: YES
GLOBULIN PLAS-MCNC: 3.4 G/DL (ref 2.8–4.4)
GLUCOSE BLD-MCNC: 87 MG/DL (ref 70–99)
HBA1C MFR BLD: 4.6 % (ref ?–5.7)
HCT VFR BLD AUTO: 41.3 %
HDLC SERPL-MCNC: 62 MG/DL (ref 40–59)
HGB BLD-MCNC: 14.7 G/DL
IMM GRANULOCYTES # BLD AUTO: 0.02 X10(3) UL (ref 0–1)
IMM GRANULOCYTES NFR BLD: 0.3 %
LDLC SERPL CALC-MCNC: 106 MG/DL (ref ?–100)
LYMPHOCYTES # BLD AUTO: 2.12 X10(3) UL (ref 1–4)
LYMPHOCYTES NFR BLD AUTO: 31.8 %
MCH RBC QN AUTO: 32.9 PG (ref 26–34)
MCHC RBC AUTO-ENTMCNC: 35.6 G/DL (ref 31–37)
MCV RBC AUTO: 92.4 FL
MONOCYTES # BLD AUTO: 0.55 X10(3) UL (ref 0.1–1)
MONOCYTES NFR BLD AUTO: 8.2 %
NEUTROPHILS # BLD AUTO: 3.84 X10 (3) UL (ref 1.5–7.7)
NEUTROPHILS # BLD AUTO: 3.84 X10(3) UL (ref 1.5–7.7)
NEUTROPHILS NFR BLD AUTO: 57.6 %
NONHDLC SERPL-MCNC: 133 MG/DL (ref ?–130)
OSMOLALITY SERPL CALC.SUM OF ELEC: 285 MOSM/KG (ref 275–295)
PLATELET # BLD AUTO: 196 10(3)UL (ref 150–450)
POTASSIUM SERPL-SCNC: 4.4 MMOL/L (ref 3.5–5.1)
PROT SERPL-MCNC: 7.1 G/DL (ref 6.4–8.2)
RBC # BLD AUTO: 4.47 X10(6)UL
SODIUM SERPL-SCNC: 138 MMOL/L (ref 136–145)
TRIGL SERPL-MCNC: 154 MG/DL (ref 30–149)
TSI SER-ACNC: 2.72 MIU/ML (ref 0.36–3.74)
VLDLC SERPL CALC-MCNC: 26 MG/DL (ref 0–30)
WBC # BLD AUTO: 6.7 X10(3) UL (ref 4–11)

## 2023-09-19 PROCEDURE — 87624 HPV HI-RISK TYP POOLED RSLT: CPT | Performed by: FAMILY MEDICINE

## 2023-09-19 PROCEDURE — 80061 LIPID PANEL: CPT | Performed by: FAMILY MEDICINE

## 2023-09-19 PROCEDURE — 3008F BODY MASS INDEX DOCD: CPT | Performed by: FAMILY MEDICINE

## 2023-09-19 PROCEDURE — 85025 COMPLETE CBC W/AUTO DIFF WBC: CPT | Performed by: FAMILY MEDICINE

## 2023-09-19 PROCEDURE — 80053 COMPREHEN METABOLIC PANEL: CPT | Performed by: FAMILY MEDICINE

## 2023-09-19 PROCEDURE — 84443 ASSAY THYROID STIM HORMONE: CPT | Performed by: FAMILY MEDICINE

## 2023-09-19 PROCEDURE — 99396 PREV VISIT EST AGE 40-64: CPT | Performed by: FAMILY MEDICINE

## 2023-09-19 PROCEDURE — 3074F SYST BP LT 130 MM HG: CPT | Performed by: FAMILY MEDICINE

## 2023-09-19 PROCEDURE — 3078F DIAST BP <80 MM HG: CPT | Performed by: FAMILY MEDICINE

## 2023-09-19 PROCEDURE — 83036 HEMOGLOBIN GLYCOSYLATED A1C: CPT | Performed by: FAMILY MEDICINE

## 2023-09-19 PROCEDURE — 88175 CYTOPATH C/V AUTO FLUID REDO: CPT | Performed by: FAMILY MEDICINE

## 2023-09-19 RX ORDER — BUPROPION HYDROCHLORIDE 150 MG/1
150 TABLET ORAL DAILY
Qty: 30 TABLET | Refills: 1 | Status: SHIPPED | OUTPATIENT
Start: 2023-09-19 | End: 2023-09-21

## 2023-09-19 NOTE — TELEPHONE ENCOUNTER
Health Provider Screening Form review and singed faxed [phone]. Conformation received    Patient was notified v/u.

## 2023-09-20 LAB — HPV I/H RISK 1 DNA SPEC QL NAA+PROBE: NEGATIVE

## 2023-09-25 NOTE — PROGRESS NOTES
Vaughn notified:  See my chart message to patient. The patient's ASCVD 10 year risk score is 0.6. Dear David Stubbs,  Your labs are normal except your lipid panel is elevated. 10-year risk of heart attack per ASCVD score is 0.6, which is low. Pap smear and HPV are negative for abnormalities. May repeat in 3 years. Treatment for mild to moderate abnormal cholesterol and lipids is best managed  initially with lifestyle changes, improving diet and increasing physical activity. Recommendations for exercise are 3-5 times weekly for 30-60 minutes for a minimum of 150-300 minutes. This will improve your cholesterol levels and strengthen your heart. If you are overweight, then losing weight may also improve your lipid profile. The Mediterranean diet is recommended and contains foods high in omega-3's and alpha linoleic acid; this helps improve your good cholesterol and may lower bad cholesterol. Eat salmon 2x weekly, consume moderate vegetables,lean meats/fish, nuts and healthy fats i.e. almonds, walnuts, flaxseed, hempseed, avocados, avocado or olive oil, spinach, green beans, organic strawberries, etc. Please avoid fried food or limit to 1 x monthly along with pizza and other foods that are high in saturated animal fats. Monitoring your cholesterol and lipid profile is recommended annually sooner as as directed by your doctor. Please call our office  if you have any further question or schedule an appointment.     Sincerely,  Jonelle Nolasco

## 2023-10-03 ENCOUNTER — PATIENT MESSAGE (OUTPATIENT)
Dept: FAMILY MEDICINE CLINIC | Facility: CLINIC | Age: 48
End: 2023-10-03

## 2023-10-03 NOTE — TELEPHONE ENCOUNTER
From: Jeovanny Myers  To: Chrissy Stratton  Sent: 10/3/2023 10:14 AM CDT  Subject: Pristiq     I had an anxiety type of side effect that was in my opinion not worth continuing. Along with the same sexual side effect of the first. Any other ideas of meds to try?

## 2023-10-03 NOTE — TELEPHONE ENCOUNTER
Have patient schedule a virtual visit. May be its time to discuss GeneSight testing.     Please inform

## 2023-10-03 NOTE — TELEPHONE ENCOUNTER
Please see my chart message. Pt c/o Pristiq is causing too many side effects. Does pt need video visit to change meds? Last OV on 9/19/2023.

## 2023-10-20 PROBLEM — E66.3 OVERWEIGHT (BMI 25.0-29.9): Status: ACTIVE | Noted: 2021-09-15

## 2023-10-26 ENCOUNTER — PATIENT MESSAGE (OUTPATIENT)
Dept: FAMILY MEDICINE CLINIC | Facility: CLINIC | Age: 48
End: 2023-10-26

## 2023-10-27 RX ORDER — BUPROPION HYDROCHLORIDE 150 MG/1
150 TABLET ORAL DAILY
COMMUNITY
Start: 2023-10-16

## 2023-11-18 DIAGNOSIS — G43.809 OTHER MIGRAINE WITHOUT STATUS MIGRAINOSUS, NOT INTRACTABLE: ICD-10-CM

## 2023-11-20 RX ORDER — RIMEGEPANT SULFATE 75 MG/75MG
1 TABLET, ORALLY DISINTEGRATING ORAL EVERY OTHER DAY
Qty: 16 TABLET | Refills: 5 | Status: SHIPPED | OUTPATIENT
Start: 2023-11-20

## 2023-11-20 NOTE — TELEPHONE ENCOUNTER
Refill no protocol available:     Pt requesting refill of   Requested Prescriptions     Pending Prescriptions Disp Refills    NURTEC 75 MG Oral Tablet Dispersible [Pharmacy Med Name: Destiney Duran ODT 75 MG TABLET] 16 tablet 3     Sig: TAKE 1 TABLET BY MOUTH EVERY OTHER DAY     Sent to Provider for review:    Last Time Medication was Filled:  7/3/2023    Last Office Visit with Provider: 10/20/2023 via telemedicine    No future appointments. Per LOV    Return in about 6 weeks (around 12/1/2023).

## 2023-12-18 ENCOUNTER — OFFICE VISIT (OUTPATIENT)
Dept: FAMILY MEDICINE CLINIC | Facility: CLINIC | Age: 48
End: 2023-12-18
Payer: COMMERCIAL

## 2023-12-18 VITALS
DIASTOLIC BLOOD PRESSURE: 80 MMHG | HEIGHT: 57.9 IN | TEMPERATURE: 97 F | WEIGHT: 115 LBS | BODY MASS INDEX: 24.14 KG/M2 | SYSTOLIC BLOOD PRESSURE: 120 MMHG | OXYGEN SATURATION: 99 % | HEART RATE: 96 BPM

## 2023-12-18 DIAGNOSIS — H65.113 ACUTE MUCOID OTITIS MEDIA OF BOTH EARS: Primary | ICD-10-CM

## 2023-12-18 PROCEDURE — 3079F DIAST BP 80-89 MM HG: CPT | Performed by: PHYSICIAN ASSISTANT

## 2023-12-18 PROCEDURE — 3008F BODY MASS INDEX DOCD: CPT | Performed by: PHYSICIAN ASSISTANT

## 2023-12-18 PROCEDURE — 99213 OFFICE O/P EST LOW 20 MIN: CPT | Performed by: PHYSICIAN ASSISTANT

## 2023-12-18 PROCEDURE — 3074F SYST BP LT 130 MM HG: CPT | Performed by: PHYSICIAN ASSISTANT

## 2023-12-18 RX ORDER — AMOXICILLIN AND CLAVULANATE POTASSIUM 875; 125 MG/1; MG/1
1 TABLET, FILM COATED ORAL 2 TIMES DAILY
Qty: 20 TABLET | Refills: 0 | Status: SHIPPED | OUTPATIENT
Start: 2023-12-18 | End: 2023-12-28

## 2024-01-03 ENCOUNTER — PATIENT MESSAGE (OUTPATIENT)
Dept: FAMILY MEDICINE CLINIC | Facility: CLINIC | Age: 49
End: 2024-01-03

## 2024-01-08 NOTE — TELEPHONE ENCOUNTER
Prediabetes  - Hemoglobin A1C; Future  - Hemoglobin A1C  Loss significant amount of weight greater than 20 pounds on GLP-1 medication with lifestyle changes and exercises      Do you need patient to come back for an office visit to discuss medication ? I pended medication for review if no OV is needed.

## 2024-01-08 NOTE — TELEPHONE ENCOUNTER
From: Geetha Stallings  To: Kelly Nelson  Sent: 1/3/2024 12:59 PM CST  Subject: Meds    Dr nelson, I know you and I are on the same insurance…do you know off hand if under our new insurance or the fda approval if Mounjaro is being covered? It says we still need prior auth but not sure if prediabetes is now covered since it’s fda approved for weight loss.

## 2024-01-09 NOTE — TELEPHONE ENCOUNTER
Needs OV in weight check every 3 to 4 months.  Please have patient schedule office visit sent in refill of semaglutide

## 2024-01-10 NOTE — TELEPHONE ENCOUNTER
Prior auth completed waiting for insurance determination     Geetha Stallings (Matthew: [id number])

## 2024-01-25 ENCOUNTER — TELEMEDICINE (OUTPATIENT)
Dept: FAMILY MEDICINE CLINIC | Facility: CLINIC | Age: 49
End: 2024-01-25
Payer: COMMERCIAL

## 2024-01-25 DIAGNOSIS — Z86.39 HISTORY OF OBESITY: ICD-10-CM

## 2024-01-25 DIAGNOSIS — E78.2 MIXED HYPERLIPIDEMIA: Primary | ICD-10-CM

## 2024-01-25 DIAGNOSIS — Z86.59 HISTORY OF ANXIETY: ICD-10-CM

## 2024-01-25 DIAGNOSIS — Z87.898 HISTORY OF PREDIABETES: ICD-10-CM

## 2024-01-25 PROCEDURE — 99213 OFFICE O/P EST LOW 20 MIN: CPT | Performed by: FAMILY MEDICINE

## 2024-01-25 NOTE — PROGRESS NOTES
Due to COVID-19 ACTION PLAN, the patient's office visit was converted to a video visit.  Time Spent 10min     Chief Complaint   Patient presents with    Medication Follow-Up     Subjective     HPI:   Geetha Stallings is a 48 year old female who presents for discuss anxiety- multiple side effects with medications- listed below:  Wellbutrin/buprion XL- rash  Sertraline- sexual side effects  Desvenlafaxine ER-anxiety worsened and sexual side effects  Prozac- didn't \"do anything\"  Lexapro- sexual side effects- anxiety    Hx of irritability and anxiety-patient stopped Lexapro due to sexual side effects and made anxiety worse.  Has not completed GeneSight testing.  Feels she is in a good place.  Her /spouse has noticed that she has been much happier.  She denies feeling depressed, sad, hopeless.  No history of lacho.  Denies crying spells.  No rapid mood swings.  No SI or HI.  Wants to continue off of medication.  Discussed trying BuSpar if issues return.    History of metabolic syndrome, prediabetes and obesity.   was on Mounjaro lost 30 pounds then prescription coverage ended.  Started on Wegovy 1.7 mg when 1 mg was not available but vomited 6 hours after taking it and had an upset stomach for a week afterwards.  Patient's gained back 6 pounds since last visit.  Semaglutide/Wegovy was denied by her insurance.  Patient is scared that she is going to regain weight is trying to eat healthy but her cholesterol and labs looked worse in September would like to repeat them.   Would like to restart Wegovy to help with maintenance and if labs are worsening.  Denies any vision changes polyuria polydipsia.   Last A1c-   HgbA1C (%)   Date Value   09/19/2023 4.6   09/23/2022 4.8   10/21/2021 4.8       Starting 144# pt 4'9\"  Now 120#     Chief Complaint Reviewed and Verified  No Further Nursing Notes to   Review  Tobacco Reviewed  Allergies Reviewed  Medications Reviewed    Problem List Reviewed  Medical History  Reviewed  Surgical History   Reviewed  OB Status Reviewed  Family History Reviewed                Current Outpatient Medications on File Prior to Visit   Medication Sig Dispense Refill    Rimegepant Sulfate (NURTEC) 75 MG Oral Tablet Dispersible Take 1 tablet by mouth every other day. 16 tablet 5    levocetirizine (XYZAL ALLERGY 24HR) 5 MG Oral Tab Take 1 tablet (5 mg total) by mouth every evening.      Levonorgestrel (MIRENA, 52 MG,) 20 MCG/DAY Intrauterine IUD 20 mcg (1 each total) by Intrauterine route one time. iud placement 09/26/2022  Esther Yuan      ondansetron 8 MG Oral Tablet Dispersible Take 1 tablet (8 mg total) by mouth every 8 (eight) hours as needed for Nausea. 30 tablet 0    FLUTICASONE PROPIONATE 50 MCG/ACT Nasal Suspension USE 1 SPRAY INTO EACH NOSTRIL TWICE A DAY 48 mL 3    semaglutide-weight management 1 MG/0.5ML Subcutaneous Solution Auto-injector Inject 0.5 mL (1 mg total) into the skin once a week. 2 mL 0     No current facility-administered medications on file prior to visit.           Physical Exam:  LMP  (LMP Unknown)     alert and cooperative, well-nourished/well-hydrated, no no pallor or tachypnea, appropriately groomed, speaking in full sentences comfortably and Normal work of breathing, answers questions appropriately            Assessment    Diagnoses and all orders for this visit:    1. Mixed hyperlipidemia  - Lipid Panel [E]; Future  The patient's ASCVD 10 year risk score is 0.6.  encourage mediterranean diet  Exercise brisk walk 30-60 mins at least 5 days weekly  Lose weight if overweight  Recheck fasting labs q6-12  months    2. History of prediabetes  3. History of obesity  - Hemoglobin A1C [E]; Future  - Basic Metabolic Panel (8) [E]; Future  Patient is concerned regain weight off of Wegovy.  Cholesterol is increasing in September would like to repeat and monitor A1c and fasting glucose.  Repeat fasting glucose to monitor glucose intolerance and A1c.  If lipids and A1c  and sugar are worsening then we will reapply for prior authorization for Wegovy or Zepbound    4. History of anxiety  Doing well off of medications multiple side effects  Complete GeneSight testing  -contracts for safety- if suicidal or homicidal, call 911 or go to nearest ER and call office  Exercise 3-5x weekly x 30-60min   Patient will follow-up if symptoms worsen or after GeneSight testing     Lab work ordered.  Reinforced healthy diet, lifestyle, and exercise.    No follow-ups on file.    Kelly Velasco, DO Geetha Stallings understands video or phone evaluation is not a substitute for face-to-face examination or emergency care. Patient advised to go to ER or call 911 for worsening symptoms or acute distress.     Patient/Caregiver Education: Patient/Caregiver Education: There are no barriers to learning. Medical education done.   Outcome: Patient verbalizes understanding. Patient is notified to call with any questions, complications, allergies, or worsening or changing symptoms.  Patient is to call with any side effects or complications from the treatments as a result of today.     Please note that the following visit was completed using two-way, real-time interactive audio and/or video communication.  This has been done in [place] to provide continuity of care in the best interest of the provider-patient relationship, due to the on-going public health crisis/national emergency and because of restrictions of visitation.  There are limitations of this visit as no physical exam could be performed.  Every conscious effort was taken to allow for sufficient and adequate time.  This billing visit was spent on reviewing labs, medications, radiology tests and decision making.  Appropriate medical decision-making and tests are ordered as detailed in the plan of care above.

## 2024-02-15 ENCOUNTER — TELEPHONE (OUTPATIENT)
Dept: FAMILY MEDICINE CLINIC | Facility: CLINIC | Age: 49
End: 2024-02-15

## 2024-02-20 ENCOUNTER — PATIENT MESSAGE (OUTPATIENT)
Dept: FAMILY MEDICINE CLINIC | Facility: CLINIC | Age: 49
End: 2024-02-20

## 2024-02-20 NOTE — TELEPHONE ENCOUNTER
From: Geetha Stallings  To: Kelly Velasco  Sent: 2/20/2024 7:04 AM CST  Subject: Gene site    Have you received the gene site test results? How do I get a copy?    Thanks!   Normal

## 2024-02-23 ENCOUNTER — PATIENT MESSAGE (OUTPATIENT)
Dept: FAMILY MEDICINE CLINIC | Facility: CLINIC | Age: 49
End: 2024-02-23

## 2024-02-26 NOTE — TELEPHONE ENCOUNTER
History of prediabetes  History of obesity  - Hemoglobin A1C [E]; Future  - Basic Metabolic Panel (8) [E]; Future  Patient is concerned regain weight off of Wegovy.  Cholesterol is increasing in September would like to repeat and monitor A1c and fasting glucose.  Repeat fasting glucose to monitor glucose intolerance and A1c.  If lipids and A1c and sugar are worsening then we will reapply for prior authorization for Wegovy or Zepbound

## 2024-02-26 NOTE — TELEPHONE ENCOUNTER
From: Geetha Stallings  To: Kelly Velasco  Sent: 2/23/2024 6:32 PM CST  Subject: Mounjaro    I can’t remember the pharmacies we discussed getting meds from but I do remember [facility]. It says Mounjaro is only $158. What was the other website you mentioned? I’m thinking I’m willing to pay $158. If that’s the cheapest can you send a script?

## 2024-02-26 NOTE — TELEPHONE ENCOUNTER
Please let patient know with [facility]-she does not need a prescription  Mounjaro is $158 for close a week 1 vial is 1 injection.  She has not been taking any Mounjaro so I would not start higher than the 5 mg dose.  I do not know of other pharmacies outside of the [place] apart from [facility].  There is [facility] and [facility] that often has Zepbound/Mounjaro in stock and can use a  coupon and is close to the same price with the  coupon    Please inform

## 2024-02-28 ENCOUNTER — LAB ENCOUNTER (OUTPATIENT)
Dept: LAB | Facility: HOSPITAL | Age: 49
End: 2024-02-28
Attending: FAMILY MEDICINE
Payer: COMMERCIAL

## 2024-02-28 DIAGNOSIS — Z87.898 HISTORY OF PREDIABETES: ICD-10-CM

## 2024-02-28 DIAGNOSIS — E78.2 MIXED HYPERLIPIDEMIA: ICD-10-CM

## 2024-02-28 LAB
ANION GAP SERPL CALC-SCNC: 3 MMOL/L (ref 0–18)
BUN BLD-MCNC: 16 MG/DL (ref 9–23)
CALCIUM BLD-MCNC: 8.8 MG/DL (ref 8.5–10.1)
CHLORIDE SERPL-SCNC: 108 MMOL/L (ref 98–112)
CHOLEST SERPL-MCNC: 227 MG/DL (ref ?–200)
CO2 SERPL-SCNC: 27 MMOL/L (ref 21–32)
CREAT BLD-MCNC: 0.77 MG/DL
EGFRCR SERPLBLD CKD-EPI 2021: 96 ML/MIN/1.73M2 (ref 60–?)
EST. AVERAGE GLUCOSE BLD GHB EST-MCNC: 88 MG/DL (ref 68–126)
FASTING PATIENT LIPID ANSWER: YES
FASTING STATUS PATIENT QL REPORTED: YES
GLUCOSE BLD-MCNC: 99 MG/DL (ref 70–99)
HBA1C MFR BLD: 4.7 % (ref ?–5.7)
HDLC SERPL-MCNC: 66 MG/DL (ref 40–59)
LDLC SERPL CALC-MCNC: 124 MG/DL (ref ?–100)
NONHDLC SERPL-MCNC: 161 MG/DL (ref ?–130)
OSMOLALITY SERPL CALC.SUM OF ELEC: 287 MOSM/KG (ref 275–295)
POTASSIUM SERPL-SCNC: 3.8 MMOL/L (ref 3.5–5.1)
SODIUM SERPL-SCNC: 138 MMOL/L (ref 136–145)
TRIGL SERPL-MCNC: 214 MG/DL (ref 30–149)
VLDLC SERPL CALC-MCNC: 38 MG/DL (ref 0–30)

## 2024-02-28 PROCEDURE — 80048 BASIC METABOLIC PNL TOTAL CA: CPT

## 2024-02-28 PROCEDURE — 80061 LIPID PANEL: CPT

## 2024-02-28 PROCEDURE — 83036 HEMOGLOBIN GLYCOSYLATED A1C: CPT

## 2024-02-28 PROCEDURE — 36415 COLL VENOUS BLD VENIPUNCTURE: CPT

## 2024-02-29 RX ORDER — TIRZEPATIDE 2.5 MG/.5ML
0.5 INJECTION, SOLUTION SUBCUTANEOUS WEEKLY
Qty: 2 ML | Refills: 0 | Status: SHIPPED | OUTPATIENT
Start: 2024-02-29

## 2024-02-29 NOTE — TELEPHONE ENCOUNTER
Cholesterol has slightly increased but prediabetes resolved.  Please try prior authorization for Zepbound

## 2024-02-29 NOTE — PROGRESS NOTES
Vaughn notified:  See my chart message to patient.  The patient's ASCVD 10 year risk score is 0.7.    Dear Geetha Stallings,  Your labs are normal except your lipid panel is elevated.  Prediabetes has resolved.  Glucose is normal.      10-year risk of heart attack per ASCVD score is 0.7, which is low. Treatment for mild to moderate abnormal cholesterol and lipids is best managed  initially with lifestyle changes, improving diet and increasing physical activity.  Recommendations for exercise are 3-5 times weekly for 30-60 minutes for a minimum of 150-300 minutes. This will improve your cholesterol levels and strengthen your heart.  If you are overweight, then losing weight may also improve your lipid profile.     The Mediterranean diet is recommended and contains foods high in omega-3's and alpha linoleic acid; this helps improve your good cholesterol and may lower bad cholesterol. Eat salmon 2x weekly, consume moderate vegetables,lean meats/fish, nuts and healthy fats i.e. almonds, walnuts, flaxseed, hempseed, avocados, avocado or olive oil, spinach, green beans, organic strawberries, etc. Please avoid fried food or limit to 1 x monthly along with pizza and other foods that are high in saturated animal fats.    Monitoring your cholesterol and lipid profile is recommended annually sooner as as directed by your doctor.  Please call our office  if you have any further question or schedule an appointment.    Sincerely,

## 2024-02-29 NOTE — TELEPHONE ENCOUNTER
Medication pended for review and signature. I need medication on her medication list in order to start PA

## 2024-03-05 NOTE — TELEPHONE ENCOUNTER
Spoke with Veena.  Staff should have access to these to be able to pull results both nursing and lead MA.  Thank you

## 2024-03-18 ENCOUNTER — PATIENT MESSAGE (OUTPATIENT)
Dept: FAMILY MEDICINE CLINIC | Facility: CLINIC | Age: 49
End: 2024-03-18

## 2024-03-18 NOTE — TELEPHONE ENCOUNTER
From: Geetha Stallings  To: Kelly Velasco  Sent: 3/18/2024 12:37 PM CDT  Subject: Geo stallings    Can you recommend a peds gi. Dr martinez office doesn’t see peds.   Thanks  Reyna

## 2024-03-18 NOTE — TELEPHONE ENCOUNTER
See Telephone encounter on this.  Mom sent under her [employer] account and not her son's.  TE created.  Thank you.;

## 2024-03-23 PROBLEM — R41.840 ATTENTION DEFICIT: Status: ACTIVE | Noted: 2024-03-23

## 2024-03-26 ENCOUNTER — MED REC SCAN ONLY (OUTPATIENT)
Dept: FAMILY MEDICINE CLINIC | Facility: CLINIC | Age: 49
End: 2024-03-26

## 2024-05-02 ENCOUNTER — PATIENT MESSAGE (OUTPATIENT)
Dept: FAMILY MEDICINE CLINIC | Facility: CLINIC | Age: 49
End: 2024-05-02

## 2024-05-03 NOTE — TELEPHONE ENCOUNTER
From: Geetha Stallings  To: Kelly Velasco  Sent: 5/2/2024 1:03 PM CDT  Subject: Zepbound    Somehow my pharmacy ([facility]) got zepbound in so I tried filling it. It came out to$550. Called insurance and they told me it should be covered and only cost a lot less. I told him the prior auth was denied if I was correct. I think you guys sent in a prior auth. Anyways, he said he would send a prefilled prior auth and try again. This was last week, not sure if you saw it or got around to it but just a heads up. Thanks!

## 2024-05-07 NOTE — TELEPHONE ENCOUNTER
Prior authorization with pending status  Waiting for Payer Response   5/3/2024 10:51 AM  Deadline to reply: May 18, 2024  6:46 AM Sending user: Vivinae Torre RN   Payer: [employer] Case ID: [id number]    [phone]    [phone]  Traditional Prior Auth   Please provide all information requested. Failure to complete this form in its entirety may result in delayed processing or the PA request will be closed for lack of information  Waiting for Payer Response   5/3/2024 10:46 AM  Sending user: Viviane Torre RN   Payer: [employer]    [phone]    [phone]

## 2024-05-29 ENCOUNTER — MED REC SCAN ONLY (OUTPATIENT)
Dept: FAMILY MEDICINE CLINIC | Facility: CLINIC | Age: 49
End: 2024-05-29

## 2024-06-10 ENCOUNTER — MED REC SCAN ONLY (OUTPATIENT)
Dept: FAMILY MEDICINE CLINIC | Facility: CLINIC | Age: 49
End: 2024-06-10

## 2024-06-30 ENCOUNTER — PATIENT MESSAGE (OUTPATIENT)
Dept: FAMILY MEDICINE CLINIC | Facility: CLINIC | Age: 49
End: 2024-06-30

## 2024-06-30 DIAGNOSIS — E66.3 OVERWEIGHT (BMI 25.0-29.9): ICD-10-CM

## 2024-07-02 NOTE — TELEPHONE ENCOUNTER
From: Geetha Stallings  To: Kelly Velasco  Sent: 6/30/2024 8:26 PM CDT  Subject: Nurtec    The pharmacy said I needed prior auth for this. Did they send you something and did you respond? [facility]    Re:zepbound, when do I up the dose and will I need prior auth done again on the new dose?

## 2024-07-03 RX ORDER — TIRZEPATIDE 2.5 MG/.5ML
0.5 INJECTION, SOLUTION SUBCUTANEOUS WEEKLY
Qty: 2 ML | Refills: 0 | Status: SHIPPED | OUTPATIENT
Start: 2024-07-03

## 2024-07-03 NOTE — TELEPHONE ENCOUNTER
Requesting refill on Zepbound 2.5mg.  has only taken for 3 weeks and due for follow up 6 weeks after start.     Sent to Dr. Velasco for approval.

## 2024-07-05 ENCOUNTER — HOSPITAL ENCOUNTER (OUTPATIENT)
Dept: MAMMOGRAPHY | Age: 49
Discharge: HOME | End: 2024-07-05
Attending: FAMILY MEDICINE
Payer: COMMERCIAL

## 2024-07-05 DIAGNOSIS — Z12.31 SCREENING MAMMOGRAM, ENCOUNTER FOR: ICD-10-CM

## 2024-07-05 PROCEDURE — 77067 SCR MAMMO BI INCL CAD: CPT | Performed by: FAMILY MEDICINE

## 2024-07-05 PROCEDURE — 77063 BREAST TOMOSYNTHESIS BI: CPT | Performed by: FAMILY MEDICINE

## 2024-07-08 PROBLEM — Z12.11 SPECIAL SCREENING FOR MALIGNANT NEOPLASM OF COLON: Status: ACTIVE | Noted: 2024-07-08

## 2024-07-22 ENCOUNTER — OFFICE VISIT (OUTPATIENT)
Dept: FAMILY MEDICINE CLINIC | Facility: CLINIC | Age: 49
End: 2024-07-22
Payer: COMMERCIAL

## 2024-07-22 ENCOUNTER — HOSPITAL ENCOUNTER (OUTPATIENT)
Dept: ULTRASOUND IMAGING | Age: 49
Discharge: HOME | End: 2024-07-22
Attending: FAMILY MEDICINE
Payer: COMMERCIAL

## 2024-07-22 ENCOUNTER — HOSPITAL ENCOUNTER (OUTPATIENT)
Dept: MAMMOGRAPHY | Age: 49
Discharge: HOME | End: 2024-07-22
Attending: FAMILY MEDICINE
Payer: COMMERCIAL

## 2024-07-22 VITALS
OXYGEN SATURATION: 100 % | BODY MASS INDEX: 26.89 KG/M2 | WEIGHT: 124.63 LBS | HEIGHT: 57.2 IN | DIASTOLIC BLOOD PRESSURE: 60 MMHG | SYSTOLIC BLOOD PRESSURE: 100 MMHG | RESPIRATION RATE: 21 BRPM | TEMPERATURE: 98 F | HEART RATE: 90 BPM

## 2024-07-22 DIAGNOSIS — R92.2 INCONCLUSIVE MAMMOGRAM: ICD-10-CM

## 2024-07-22 DIAGNOSIS — Z13.0 SCREENING FOR ENDOCRINE, NUTRITIONAL, METABOLIC AND IMMUNITY DISORDER: ICD-10-CM

## 2024-07-22 DIAGNOSIS — Z13.21 SCREENING FOR ENDOCRINE, NUTRITIONAL, METABOLIC AND IMMUNITY DISORDER: ICD-10-CM

## 2024-07-22 DIAGNOSIS — Z13.228 SCREENING FOR ENDOCRINE, NUTRITIONAL, METABOLIC AND IMMUNITY DISORDER: ICD-10-CM

## 2024-07-22 DIAGNOSIS — Z87.898 HISTORY OF PREDIABETES: ICD-10-CM

## 2024-07-22 DIAGNOSIS — Z23 NEED FOR VACCINATION: ICD-10-CM

## 2024-07-22 DIAGNOSIS — E78.2 MIXED HYPERLIPIDEMIA: ICD-10-CM

## 2024-07-22 DIAGNOSIS — E55.9 VITAMIN D DEFICIENCY: ICD-10-CM

## 2024-07-22 DIAGNOSIS — E66.3 OVERWEIGHT (BMI 25.0-29.9): ICD-10-CM

## 2024-07-22 DIAGNOSIS — Z86.79 HISTORY OF PSVT (PAROXYSMAL SUPRAVENTRICULAR TACHYCARDIA): ICD-10-CM

## 2024-07-22 DIAGNOSIS — Z13.29 SCREENING FOR ENDOCRINE, NUTRITIONAL, METABOLIC AND IMMUNITY DISORDER: ICD-10-CM

## 2024-07-22 DIAGNOSIS — G43.809 OTHER MIGRAINE WITHOUT STATUS MIGRAINOSUS, NOT INTRACTABLE: ICD-10-CM

## 2024-07-22 DIAGNOSIS — Z00.00 ANNUAL PHYSICAL EXAM: Primary | ICD-10-CM

## 2024-07-22 DIAGNOSIS — S76.012A HIP STRAIN, LEFT, INITIAL ENCOUNTER: ICD-10-CM

## 2024-07-22 PROBLEM — F41.9 ANXIETY: Status: RESOLVED | Noted: 2023-05-30 | Resolved: 2024-07-22

## 2024-07-22 PROBLEM — R11.0 NAUSEA: Status: RESOLVED | Noted: 2023-02-14 | Resolved: 2024-07-22

## 2024-07-22 PROBLEM — Z97.5 IUD (INTRAUTERINE DEVICE) IN PLACE: Status: ACTIVE | Noted: 2024-07-22

## 2024-07-22 LAB
CONTROL LINE PRESENT WITH A CLEAR BACKGROUND (YES/NO): YES YES/NO
KIT LOT #: NORMAL NUMERIC
PREGNANCY TEST, URINE: NEGATIVE

## 2024-07-22 PROCEDURE — 77061 BREAST TOMOSYNTHESIS UNI: CPT | Performed by: FAMILY MEDICINE

## 2024-07-22 PROCEDURE — 77065 DX MAMMO INCL CAD UNI: CPT | Performed by: FAMILY MEDICINE

## 2024-07-22 PROCEDURE — 76642 ULTRASOUND BREAST LIMITED: CPT | Performed by: FAMILY MEDICINE

## 2024-07-22 RX ORDER — TIRZEPATIDE 5 MG/.5ML
5 INJECTION, SOLUTION SUBCUTANEOUS WEEKLY
Qty: 6 ML | Refills: 0 | Status: SHIPPED | OUTPATIENT
Start: 2024-07-22

## 2024-07-22 NOTE — PATIENT INSTRUCTIONS
Perform labs fasting 8 hours with water or black coffee or or black tea diet  soda only prior to exam.    -Encourage healthy diet of whole food and avoid processed food and sugary drinks and sodas.  Diet should include lean meats and vegetables including 5-7 servings of fruit and vegetables total in 1 day.  Never skip breakfast.  -Encouraged exercise 30 minutes to 60 minutes 3-5 times weekly for 150minutes or more to prevent obesity and chronic disease and eliminate stress and its effect on the body.  -encouraged to continue not smoking or vaping  - recommend condom use per CDC recommendation for all  or unmarried couples  -mammogram order given if 40years old or older  - immunizations-annual flu shot recommended  -Vitamin D3  2000 units daily recommended- buy Over-the-counter  -Recommend 1000mg of calcium daily for osteoporosis prevention discussed. Need to ingest 1000mg of calcium daily to prevent osteoporosis later in life.  I.e. one 8 ounce glass of silk [place] milk has 450 mg of calcium and label states 45%.  Labels list calcium percentages not milligrams.  To calculate milligrams per serving remove the percentage and add a zero (0).  I.e. 9% calcium equals 90 mg  -thin prep pap recommended every 3 years-If previous pap was normal  sooner as directed by your doctor.

## 2024-07-22 NOTE — PROGRESS NOTES
REASON FOR VISIT:    Geetha Stallings is a 48 year old female who presents for an Annual Health Assessment.    Migraines-stable can have 2xmonthly.  On Nurtec. Has chest tightness on sumitriptan and has Mirena IUD feels headaches have greatly improved.  Varies depends on stress levels.  Any travel will trigger a migraine.  Patient takes Nurtec and completely relieves her headaches.  Denies any side effects with medication.  Denies any change in pattern or intensity.  No nausea or vomiting.  Started on Nurtec from an online physician.  Works well.  Denies any side effects.      History of obesity on Zepbound 2.5 mg-ran out and did receive approval from insurance.  Denies any medication side effects.  States 2.5 mg not controlling appetite.  Trying to stay active walks 10,000 steps a day.  Trying to eat healthy Mediterranean type diet.    Left hip injury-in [place] slipped getting into the shower and landed on her left buttock.  Has pain on the ball of the hip joint and can radiate down it up slightly into the groin.  Hip feels stiff in the morning.  Able to weight-bear.  States injury occurred 3 weeks ago and does not feel its improved.  Would like to see orthopedist.  Denies any LOC, head injury, knee or ankle or other injury.  Not taking anything for pain.      -nurse in cath lab.   - twins ages 15  and 19 year old daughter   Birth control-Mirena IUD-placed 2022  menses: Amenorrhea-followed by Dr. Santana  Last pap: 2023-normal Pap, negative HPV                  2021 normal Pap, negative HPV                   History of abnormal pap: 20 ASCUS neg hPV-  colopscopy with Dr. [id number] 10/6/2020 negative ECC  On vit D daily -yes  MVI-no  Calcium-not monitoring, [place] diet  colonoscopy-no  mammogram-2024 birads 1   Exercise -30min few times a week. Walks 10,000 steps at work 5 days  Diet-rainbow diet.  Eats vegetables and lean meat.  Some processed food  Dentist regularly-  yes, brushes teeth bid  Annual eye exam-  No glasses, due for checkup  Etoh: 2-3 drinks per week  Cigs: former smoker- quit Jan 2017 1/2 pack x 20 years, no vaping or ilicits or marijuana  Immunizations:needs MMR, hep A, will complete new COVID-19 booster  FH significant: adopted      Wt Readings from Last 6 Encounters:   07/22/24 124 lb 9.6 oz (56.5 kg)   12/18/23 115 lb (52.2 kg)   09/19/23 114 lb 12.8 oz (52.1 kg)   05/30/23 109 lb 6.4 oz (49.6 kg)   05/02/23 110 lb (49.9 kg)   03/16/23 114 lb (51.7 kg)     Patient Active Problem List   Diagnosis    External hemorrhoids    GERD (gastroesophageal reflux disease)    Headache following lumbar puncture    Migraine    Vitamin D deficiency    Environmental allergies    Mixed hyperlipidemia    ASCUS with positive high risk HPV cervical    History of PSVT (paroxysmal supraventricular tachycardia)    Overweight (BMI 25.0-29.9)    Dense breasts    Breakthrough bleeding on birth control pills    Chronic constipation    Encounter for insertion of intrauterine contraceptive device (IUD)    Elevated fasting glucose    Metabolic syndrome    History of prediabetes    Nausea    BMI 24.0-24.9, adult    Anxiety    History of anxiety    History of obesity    Attention deficit    Special screening for malignant neoplasm of colon     Current Outpatient Medications   Medication Sig Dispense Refill    Tirzepatide-Weight Management (ZEPBOUND) 2.5 MG/0.5ML Subcutaneous Solution Auto-injector Inject 0.5 mL into the skin once a week. 2 mL 0    Rimegepant Sulfate (NURTEC) 75 MG Oral Tablet Dispersible Take 1 tablet by mouth every other day. 16 tablet 5    levocetirizine (XYZAL ALLERGY 24HR) 5 MG Oral Tab Take 1 tablet (5 mg total) by mouth every evening.      Levonorgestrel (MIRENA, 52 MG,) 20 MCG/DAY Intrauterine IUD 20 mcg (1 each total) by Intrauterine route one time. iud placement 09/26/2022  Esther Yuan      ondansetron 8 MG Oral Tablet Dispersible Take 1 tablet (8 mg total) by  mouth every 8 (eight) hours as needed for Nausea. 30 tablet 0    FLUTICASONE PROPIONATE 50 MCG/ACT Nasal Suspension USE 1 SPRAY INTO EACH NOSTRIL TWICE A DAY 48 mL 3     Wt Readings from Last 6 Encounters:   07/22/24 124 lb 9.6 oz (56.5 kg)   12/18/23 115 lb (52.2 kg)   09/19/23 114 lb 12.8 oz (52.1 kg)   05/30/23 109 lb 6.4 oz (49.6 kg)   05/02/23 110 lb (49.9 kg)   03/16/23 114 lb (51.7 kg)     Body mass index is 26.77 kg/m².    No results found for: \"GLUCOSE\"  Lab Results   Component Value Date    CHOLEST 227 (H) 02/28/2024    CHOLEST 195 09/19/2023    CHOLEST 182 05/30/2023     Lab Results   Component Value Date    HDL 66 (H) 02/28/2024    HDL 62 (H) 09/19/2023    HDL 58 05/30/2023     No results found for: \"TRIGLY\"  Lab Results   Component Value Date     (H) 02/28/2024     (H) 09/19/2023     (H) 05/30/2023     Lab Results   Component Value Date    AST 26 09/19/2023    AST 34 09/23/2022    AST 23 10/21/2021     Lab Results   Component Value Date    ALT 29 09/19/2023    ALT 28 09/23/2022    ALT 27 10/21/2021     Lab Results   Component Value Date    TSH 2.720 09/19/2023    TSH 2.810 09/23/2022    TSH 3.530 10/21/2021     Lab Results   Component Value Date    BUN 16 02/28/2024    BUN 12 09/19/2023    BUN 18 05/30/2023    CREATSERUM 0.77 02/28/2024    CREATSERUM 0.92 09/19/2023    CREATSERUM 0.88 05/30/2023       General Health     How would you describe your current health state?: Good    Type of Diet: Balanced    How do you maintain positive mental well-being?: Social Interaction;Puzzles;Visiting Friends;Games;Visiting Family    How would you describe your daily physical activity?: Moderate    If you are a male age 45-79 or a female age 55-79, do you take aspirin?: No    Have you had any immunizations at another office such as Influenza, Hepatitis B, Tetanus, or Pneumococcal?: No    At any time do you feel concerned for the safety/well-being of yourself and/or your children, in your home or  elsewhere?: No     CAGE:     Cut: Have you ever felt you should Cut down on your drinking?: No    Annoyed: Have people Annoyed you by criticizing your drinking?: No    Guilty: Have you ever felt bad or Guilty about your drinking?: No    Eye Opener: Have you ever had a drink first thing in the morning to steady your nerves or to get rid of a hangover (Eye opener)?: No    Scoring  Total Score: 0     Depression Screening (PHQ-2/PHQ-9): Over the LAST 2 WEEKS   Little interest or pleasure in doing things (over the last two weeks)?: Not at all    Feeling down, depressed, or hopeless (over the last two weeks)?: Not at all    PHQ-2 SCORE: 0        PREVENTATIVE SERVICES  INDICATIONS AND SCHEDULE Recommendation Internal Lab or Procedure External Lab or Procedure   Breast Cancer Screening   Every 2 yrs age 50-74 Health Maintenance   Topic Date Due    Mammogram  07/22/2025       Pap Every 3 yrs age 21-65 or Pap and HPV every 5 yrs age 30-65 Health Maintenance   Topic Date Due    Pap Smear  09/19/2026       Chlamydia Screening Screen Annually age<25, if sex active/on OCPs; >24 high risk No results found for: \"CHLAMYDIA\"    Colonoscopy Screen Every 10 years Health Maintenance   Topic Date Due    Colorectal Cancer Screening  07/08/2034       Flex Sigmoidoscopy Screen  Every 5 years No results found for this or any previous visit.    Fecal Occult Blood  Annually No results found for: \"FOB\", \"OCCULTSTOOL\"    Obesity Screening Screen all adults annually Body mass index is 26.77 kg/m².      Preventive Services for Which Recommendations Vary with Risk Recommendation Internal Lab or Procedure External Lab or Procedure   Cholesterol Screening Recommended screening varies with age, risk and gender LDL Cholesterol (mg/dL)   Date Value   02/28/2024 124 (H)       Diabetes Screening  if history of high blood pressure or other  risk factors HgbA1C (%)   Date Value   02/28/2024 4.7     Glucose (mg/dL)   Date Value   02/28/2024 99          Gonorrhea Screening if high risk No results found for: \"GONOCOCCUS\"    HIV Screening For all adults age 18-65, older adults at increased risk No results found for: \"HIV\"    Syphilis Screening Screen if pregnant or high risk No results found for: \"RPR\"    Hepatitis C Screening Screen those at high risk plus screen one time for adults born 1943-1 965 No results found for: \"HCVAB\"    Tuberculosis Screen if high risk No components found for: \"PPDINDURAT\"      Disease Monitoring:    SPECIFIC DISEASE MONITORING Internal Lab or Procedure External Lab or Procedure   Annual Monitoring of Persistent     Medications (ACE/ARB, digoxin, diuretics)    Potassium  Annually Potassium (mmol/L)   Date Value   02/28/2024 3.8         No data to display                Creatinine  Annually Creatinine (mg/dL)   Date Value   02/28/2024 0.77         No data to display                Digoxin Serum Conc  Annually No results found for: \"DIGOXIN\"      No data to display                Diabetes      HgbA1C  Annually HgbA1C (%)   Date Value   02/28/2024 4.7         No data to display                Creat/alb ratio  Annually Creatinine (mg/dL)   Date Value   02/28/2024 0.77        LDL  Annually LDL Cholesterol (mg/dL)   Date Value   02/28/2024 124 (H)         No data to display                 Dilated Eye exam  Annually      No data to display                   No data to display                Asthma  (Annually between Nov. 1 & Dec. 31)    Date of last AAP/ACT and counseling given on importance of controller meds.                 ALLERGIES:     Allergies   Allergen Reactions    Prednisone MYALGIA    Sumatriptan OTHER (SEE COMMENTS)     Chest pain     Bupropion RASH     hives    Cefdinir RASH     Headache       CURRENT MEDICATIONS:   Current Outpatient Medications   Medication Sig Dispense Refill    Tirzepatide-Weight Management (ZEPBOUND) 2.5 MG/0.5ML Subcutaneous Solution Auto-injector Inject 0.5 mL into the skin once a week. 2 mL 0     Rimegepant Sulfate (NURTEC) 75 MG Oral Tablet Dispersible Take 1 tablet by mouth every other day. 16 tablet 5    levocetirizine (XYZAL ALLERGY 24HR) 5 MG Oral Tab Take 1 tablet (5 mg total) by mouth every evening.      Levonorgestrel (MIRENA, 52 MG,) 20 MCG/DAY Intrauterine IUD 20 mcg (1 each total) by Intrauterine route one time. iud placement 09/26/2022  Esther Yuan      ondansetron 8 MG Oral Tablet Dispersible Take 1 tablet (8 mg total) by mouth every 8 (eight) hours as needed for Nausea. 30 tablet 0    FLUTICASONE PROPIONATE 50 MCG/ACT Nasal Suspension USE 1 SPRAY INTO EACH NOSTRIL TWICE A DAY 48 mL 3      MEDICAL INFORMATION:   Past Medical History:    Abdominal hernia    Bilat inguinal and umbilical repaired 2005    Allergic rhinitis    ASCUS with positive high risk HPV cervical    Bloating    Exacerbated with stress    Blurred vision    Body piercing    Bronchitis    Calculus of kidney    Chronic constipation    Constipation    Occasional    Dense breasts    Heterogeneously dense breasts, cat c on mammogram    Dysplasia of cervix, low grade (REN 1)    CIN1, negative ECC     Elevated fasting glucose    9/2021 & 9/23/22    Environmental allergies    External hemorrhoids    Eye muscle weakness, right    intermittent after closes eye for long periods    Flatulence/gas pain/belching    GERD (gastroesophageal reflux disease)    Gestational diabetes mellitus (GDM) controlled on oral hypoglycemic drug (HCC)    Glyburide    Headache disorder    Treated with nurtec, hormonal and seasonal    Headache following lumbar puncture    Hemorrhoids    Hemorrhoidectomy 2015?    History of cardiac murmur    Resolved    History of tobacco use    Hyperlipidemia    Hypertriglyceridemia    Indigestion    Occassional    Infertility management    IVF to conceive twins - male factor - retrograde ejaculation    Kidney stones    Memory deficit    Migraine with aura    Improved by taking OCP continuously. Rare visual aura.     Pap  smear for cervical cancer screening    Pap & HPV negative.     Paratubal cyst    Removal of large paratubal cysts laparoscopically     Pleuritic chest pain    Right chronic serous otitis media    Screening mammogram, encounter for    Benign. Consider supplemental screening    Subacute ethmoidal sinusitis    SVT (supraventricular tachycardia) (HCC)    2 episodes around age 23. Patient has had echocardiograms & states cardiology says was benign    Tobacco abuse    Transient right leg weakness    Transient weakness of left leg    Vitamin B1 deficiency    Low vitamin B1    Vitamin D deficiency    Wears glasses      Past Surgical History:   Procedure Laterality Date    Adenoidectomy  1992      2005    Primary  - failure to progress      10/28/2009    [facility]    Colposcopy,bx cervix/endocerv curr  10/06/2020    CIN1, ECC negative. Dr. Divya Santana    D & c  2008    Done at time of laparoscopy     Hemorrhoidectomy  2014    single-quadrant hemorrhoidectomy with excision of perinal skin tag - for thrombosed external hemorrhoid    Hemorrhoidectomy,int/ext,simple  2015    Hernia surgery      \"triple hernia\" surgery    Insert intrauterine device  2022    Mirena IUD - sounds 9 cm. Dr. Divya Santana    Knee surgery Left     ACL repair    Laparoscopy procedure unlisted  2008    Laparoscopy, lysis of adhesions, bilateral resection of paratubal cysts, chromopertubation - Dr. Alex Rosado,     [facility]  ,     Other surgical history      IVF    Removal of radial styloid  2002    Tonsillectomy  1992    [place] teeth removed        Family History   Adopted: Yes   Problem Relation Age of Onset    No Known Problems Sister     No Known Problems Son     No Known Problems Son     No Known Problems Daughter       SOCIAL HISTORY:   Social History     Socioeconomic History    Marital status:    Tobacco Use    Smoking status: Former     Current packs/day:  0.00     Types: Cigarettes     Start date: 2017     Quit date: 2017     Years since quittin.5    Smokeless tobacco: Former   Vaping Use    Vaping status: Never Used   Substance and Sexual Activity    Alcohol use: Yes     Alcohol/week: 5.0 standard drinks of alcohol     Types: 5 Cans of beer per week     Comment: 2 drinks a week    Drug use: No    Sexual activity: Yes     Partners: Male     Birth control/protection: I.U.D., Mirena     Comment: Mirena IUD placed 24   Other Topics Concern    Caffeine Concern No    Stress Concern No    Weight Concern No    Special Diet No    Exercise Yes    Seat Belt Yes    Self-Exams No     Occ: Works Cath Lab :        REVIEW OF SYSTEMS:   GENERAL: feels well otherwise  SKIN: denies any unusual skin lesions  EYES: denies blurred vision or double vision  HEENT: denies nasal congestion, sinus pain or ST  LUNGS: denies shortness of breath with exertion  CARDIOVASCULAR: denies chest pain on exertion  GI: denies abdominal pain, denies heartburn  : denies dysuria, vaginal discharge or itching, periods regular   MUSCULOSKELETAL: denies back pain  NEURO: Hx migraines  PSYCHE: denies depression. hx anxiety  HEMATOLOGIC: denies hx of anemia  ENDOCRINE: denies thyroid history  ALL/ASTHMA: denies hx of allergy or asthma    EXAM:   /60 (BP Location: Left arm, Patient Position: Sitting, Cuff Size: adult)   Pulse 90   Temp 97.9 °F (36.6 °C) (Temporal)   Resp 21   Ht 4' 9.2\" (1.453 m)   Wt 124 lb 9.6 oz (56.5 kg)   SpO2 100%   BMI 26.77 kg/m²    No LMP recorded. (Menstrual status: IUD - Intrauterine Device).   GENERAL: well developed, well nourished, in no apparent distress  SKIN: no rashes, no suspicious lesions  HEENT: atraumatic, normocephalic, ears clear, oropharynx clear.  EYES:PERRLA, EOMI, normal optic disk, conjunctiva are clear  NECK: supple, no adenopathy, no bruits  CHEST: no chest tenderness  BREAST: No dominant nodules or masses bilateral,  axilla clear bilateral  LUNGS: clear to auscultation  CARDIO: RRR without murmur  GI: good BS's, no masses, HSM or tenderness  : normal perineum, scant vaginal discharge, normal cervix,, normal adnexa bilateral no masses or fullness or tenderness.  Uterus normal  RECTAL: Normal rectal tone  MUSCULOSKELETAL: back is not tender, FROM of the back  EXTREMITIES: no cyanosis, clubbing or edema  NEURO: Oriented times three, cranial nerves are intact, motor and sensory are grossly intact    ASSESSMENT AND OTHER RELEVANT CHRONIC CONDITIONS:   Geetha Stallings is a 48 year old female who presents for an Annual Health Assessment.     PLAN SUMMARY:   1. Annual physical exam  -Encourage Mediterranean diet  -Encouraged exercise 30 minutes to 60 minutes 3-5 times weekly for 150 to 300 minutes to prevent obesity and chronic disease and eliminate stress and its effect on the body.  -encouraged to continue not smoking  -safe sex practices - recommend condom use  -mammogram completed 3/30/2018-normal, encouraged monthly self breast exams  -immunizations  will complete new COVID-19 booster, recommend annual flu shot, had hep B but would like titers checked, start hep A, will complete flu shot through [facility].  -Vitamin D3  2000 units daily recommended  -Needs 1000 mg of calcium daily for osteoporosis prevention discussed  -thin prep pap recommended interval per gynecologist  - CBC With Differential With Platelet; Future  - Comp Metabolic Panel (14); Future  - Lipid Panel; Future  - TSH W Reflex To Free T4; Future  - Vitamin D [E]; Future  - Hemoglobin A1C; Future  - Hep A, Adult [59815]  - MMR [70472]    2. Mixed hyperlipidemia  - Lipid Panel; Future  The patient's ASCVD 10 year risk score is 0.5.  Adopted-family history unknown   mediterranean diet  Continue weight loss  Exercise start brisk walk 30 mins at least 5 days weekly  Repeat labs annually to monitor lifestyle control.    3. History of PSVT (paroxysmal supraventricular  tachycardia)  Asymptomatic-worried about PFO and migraines previously but discussed with Dr. Smalls and told not to worry about it per patient.    4. Other migraine without status migrainosus, not intractable  Off oral progesterone-mood and weight gain issues but migraines controlled  Uses Nurtec as abortive without any side effects  Hormonal, allergy, stress triggered  Improved off of norethindrone has Mirena IUD  Continue Nurtec as needed    5. Vitamin D deficiency  - Vitamin D [E]; Future  Not on vitamin D.    6. History of prediabetes  - Hemoglobin A1C; Future  - Hemoglobin A1C    7. Overweight (BMI 25.0-29.9)  -Increase Tirzepatide-Weight Management (ZEPBOUND) 5 MG/0.5ML Subcutaneous Solution Auto-injector; Inject 5 mg into the skin once a week.  Dispense: 6 mL; Refill: 0  Loss greater than 20 pounds previously BMI was around 24  Continue with Zepbound  Risk and benefits of  tirzepatide discussed-, nausea, vomiting, epigastric pain.  Risk of pancreatitis, medullary thyroid carcinoma/thyroid tumor, discussed if neck mass, dysphasia, dyspnea, persistent hoarseness, stop medication and call.  Any severe symptoms with side effects such as epigastric pain, vomiting, angioedema, increased heart rate, call office ASAP  Recheck weight in 3 months in office to assess if titrating dose is indicated    8. Hip strain, left, initial encounter  - Ortho Referral - In Network  - XR HIP + PELVIS MIN 4 VIEWS LEFT (CPT=73503); Future  Weightbearing and walking without any difficulty.  Pain is over the ASIS and can radiate into the groin  Unlikely fracture due to nonantalgic gait  Most likely hip strain  With history of fall complete x-ray and if not resolved in the next 2 weeks and follow-up with orthopedist  Patient was interested in possibly an injection.    9. Screening for endocrine, nutritional, metabolic and immunity disorder  - CBC With Differential With Platelet; Future  - Comp Metabolic Panel (14); Future  - TSH W  Reflex To Free T4; Future    10. Need for vaccination  - Hep A, Adult [71163]  - MMR [85316]  - Urine Preg Test- negative    The patient indicates understanding of these issues and agrees to the plan.  Return in about 1 year (around 7/22/2025) for annual physical, fasting labs AM, sooner if needed..    Diet counseling perfomed  Exercise counseling perfomed    SUGGESTED VACCINATIONS - Influenza, Pneumococcal, Zoster, Tetanus     Immunization History   Administered Date(s) Administered    >=3 YRS TRI  MULTIDOSE VIAL (23081) FLU CLINIC 10/26/2023    Covid-19 Vaccine Pfizer 30 mcg/0.3 ml 12/21/2020, 01/11/2021, 11/03/2021    FLULAVAL 6 months & older 0.5 ml Prefilled syringe (30657) 10/11/2022    Fluvirin, 3 Years & >, Im 10/02/2013, 10/08/2014    Hep A, Adult 09/20/2022    Hpv Virus Vaccine 9 Kim Im 08/29/2022, 11/16/2022, 03/16/2023    Influenza 01/01/2013, 10/01/2017, 10/10/2018, 10/07/2019, 09/29/2021    TDAP 01/01/2013, 02/06/2013, 09/20/2022    Varicella Deferred (Had Chicken Pox) 08/21/1988       Influenza Annually   Pneumococcal if high risk   Td/Tdap once then every 10 years   HPV Females 11-26: 3 doses   Zoster (Shingles) 60 and older: one dose   Varicella 2 doses if not immune   MMR 1-2 doses if born after 1954 and not immune

## 2024-08-07 ENCOUNTER — HOSPITAL ENCOUNTER (OUTPATIENT)
Dept: GENERAL RADIOLOGY | Facility: HOSPITAL | Age: 49
Discharge: HOME | End: 2024-08-07
Attending: FAMILY MEDICINE
Payer: COMMERCIAL

## 2024-08-07 DIAGNOSIS — S76.012A HIP STRAIN, LEFT, INITIAL ENCOUNTER: ICD-10-CM

## 2024-08-07 PROCEDURE — 73502 X-RAY EXAM HIP UNI 2-3 VIEWS: CPT | Performed by: FAMILY MEDICINE

## 2024-08-29 ENCOUNTER — HOSPITAL ENCOUNTER (OUTPATIENT)
Dept: GENERAL RADIOLOGY | Facility: HOSPITAL | Age: 49
Discharge: HOME | End: 2024-08-29
Attending: PREVENTIVE MEDICINE

## 2024-08-29 ENCOUNTER — OFFICE VISIT (OUTPATIENT)
Dept: OTHER | Facility: HOSPITAL | Age: 49
End: 2024-08-29
Attending: PREVENTIVE MEDICINE

## 2024-08-29 DIAGNOSIS — S90.112A CONTUSION OF GREAT TOE OF LEFT FOOT: Primary | ICD-10-CM

## 2024-08-29 DIAGNOSIS — S90.112A CONTUSION OF GREAT TOE OF LEFT FOOT: ICD-10-CM

## 2024-08-29 PROCEDURE — 73660 X-RAY EXAM OF TOE(S): CPT | Performed by: PREVENTIVE MEDICINE

## 2024-09-04 ENCOUNTER — APPOINTMENT (OUTPATIENT)
Dept: OTHER | Facility: HOSPITAL | Age: 49
End: 2024-09-04
Attending: NURSE PRACTITIONER

## 2024-09-29 DIAGNOSIS — E78.2 MIXED HYPERLIPIDEMIA: ICD-10-CM

## 2024-09-29 DIAGNOSIS — E66.3 OVERWEIGHT (BMI 25.0-29.9): ICD-10-CM

## 2024-09-30 RX ORDER — TIRZEPATIDE 5 MG/.5ML
5 INJECTION, SOLUTION SUBCUTANEOUS WEEKLY
Qty: 2 ML | Refills: 1 | Status: SHIPPED | OUTPATIENT
Start: 2024-09-30

## 2024-09-30 NOTE — TELEPHONE ENCOUNTER
Pt requesting refill of   Requested Prescriptions     Pending Prescriptions Disp Refills    Tirzepatide-Weight Management (ZEPBOUND) 5 MG/0.5ML Subcutaneous Solution Auto-injector [Pharmacy Med Name: ZEPBOUND 5 MG/0.5 ML PEN] 6 mL 0     Sig: INJECT 5 MG SUBCUTANEOUSLY WEEKLY     Last Time Medication was Filled:  7/22/2024 6 mL 0 refills    Last Office Visit with Provider: 7/22/2024  Overweight (BMI 25.0-29.9)  -Increase Tirzepatide-Weight Management (ZEPBOUND) 5 MG/0.5ML Subcutaneous Solution Auto-injector; Inject 5 mg into the skin once a week.  Dispense: 6 mL; Refill: 0  Loss greater than 20 pounds previously BMI was around 24  Continue with Zepbound  Risk and benefits of  tirzepatide discussed-, nausea, vomiting, epigastric pain.  Risk of pancreatitis, medullary thyroid carcinoma/thyroid tumor, discussed if neck mass, dysphasia, dyspnea, persistent hoarseness, stop medication and call.  Any severe symptoms with side effects such as epigastric pain, vomiting, angioedema, increased heart rate, call office ASAP  Recheck weight in 3 months in office to assess if titrating dose is indicated    No future appointments.   Return in about 1 year (around 7/22/2025) for annual physical, fasting labs AM, sooner if needed..

## 2024-10-16 ENCOUNTER — OFFICE VISIT (OUTPATIENT)
Dept: FAMILY MEDICINE CLINIC | Facility: CLINIC | Age: 49
End: 2024-10-16
Payer: COMMERCIAL

## 2024-10-16 VITALS
BODY MASS INDEX: 26 KG/M2 | DIASTOLIC BLOOD PRESSURE: 60 MMHG | TEMPERATURE: 98 F | HEART RATE: 90 BPM | SYSTOLIC BLOOD PRESSURE: 102 MMHG | WEIGHT: 120.81 LBS | OXYGEN SATURATION: 98 %

## 2024-10-16 DIAGNOSIS — J01.40 ACUTE NON-RECURRENT PANSINUSITIS: Primary | ICD-10-CM

## 2024-10-16 DIAGNOSIS — R42 VERTIGO: ICD-10-CM

## 2024-10-16 PROCEDURE — 99213 OFFICE O/P EST LOW 20 MIN: CPT | Performed by: NURSE PRACTITIONER

## 2024-10-16 RX ORDER — FLUTICASONE PROPIONATE 50 MCG
2 SPRAY, SUSPENSION (ML) NASAL DAILY
Qty: 1 EACH | Refills: 0 | Status: SHIPPED | OUTPATIENT
Start: 2024-10-16

## 2024-10-16 RX ORDER — MECLIZINE HYDROCHLORIDE 25 MG/1
25 TABLET ORAL 3 TIMES DAILY PRN
Qty: 15 TABLET | Refills: 0 | Status: SHIPPED | OUTPATIENT
Start: 2024-10-16

## 2024-10-16 NOTE — PATIENT INSTRUCTIONS
Medication as prescribed  ER for any worsening / severe symptoms such as worsening dizziness/vertigo, severe headache, vision changes, weakness.   Follow up with your primary care doctor if not improving the next 2 days.

## 2024-10-16 NOTE — PROGRESS NOTES
CHIEF COMPLAINT:     Chief Complaint   Patient presents with    Sinus Problem     Vertigo - Entered by patient  S/s since AM.  OTC meds taken.  Nasal drainage at AM        HPI:   Geetha Stallings is a 49 year old female who presents for sinus congestion and vertigo sxs. Reports about 2 weeks ago developed cold symptoms. Seemed to improve at first but then over the weekend developed sensation of ear itching/ fluid in ears, sinus pressure, sinus headache. Headaches resolved the past 2 days. This morning developed sensation of vertigo. States feels \"spinning sensation\" with sudden head movements. Denies any weakness, vision changes, headache or confusion. Vertigo sxs improved with otc dramamine. Has hx of vertigo in the past that was triggered by sinus sxs.     Current Outpatient Medications   Medication Sig Dispense Refill    amoxicillin clavulanate 875-125 MG Oral Tab Take 1 tablet by mouth 2 (two) times daily for 7 days. 14 tablet 0    fluticasone propionate 50 MCG/ACT Nasal Suspension 2 sprays by Each Nare route daily. 1 each 0    meclizine 25 MG Oral Tab Take 1 tablet (25 mg total) by mouth 3 (three) times daily as needed for Dizziness. 15 tablet 0    Tirzepatide-Weight Management (ZEPBOUND) 5 MG/0.5ML Subcutaneous Solution Auto-injector Inject 5 mg into the skin once a week. 2 mL 1    Tirzepatide-Weight Management (ZEPBOUND) 2.5 MG/0.5ML Subcutaneous Solution Auto-injector Inject 0.5 mL into the skin once a week. 2 mL 0    Rimegepant Sulfate (NURTEC) 75 MG Oral Tablet Dispersible Take 1 tablet by mouth every other day. 16 tablet 5    levocetirizine (XYZAL ALLERGY 24HR) 5 MG Oral Tab Take 1 tablet (5 mg total) by mouth every evening.      Levonorgestrel (MIRENA, 52 MG,) 20 MCG/DAY Intrauterine IUD 20 mcg (1 each total) by Intrauterine route one time. iud placement 09/26/2022  Esther Yuan      ondansetron 8 MG Oral Tablet Dispersible Take 1 tablet (8 mg total) by mouth every 8 (eight) hours as needed for  Nausea. 30 tablet 0    FLUTICASONE PROPIONATE 50 MCG/ACT Nasal Suspension USE 1 SPRAY INTO EACH NOSTRIL TWICE A DAY 48 mL 3      Past Medical History:    Abdominal hernia    Bilat inguinal and umbilical repaired 2005    Allergic rhinitis    ASCUS with positive high risk HPV cervical    Bloating    Exacerbated with stress    Blurred vision    Body piercing    Bronchitis    Calculus of kidney    Chronic constipation    Constipation    Occasional    Dense breasts    Heterogeneously dense breasts, cat c on mammogram    Dysplasia of cervix, low grade (REN 1)    CIN1, negative ECC     Elevated fasting glucose    9/2021 & 9/23/22    Environmental allergies    External hemorrhoids    Eye muscle weakness, right    intermittent after closes eye for long periods    Flatulence/gas pain/belching    GERD (gastroesophageal reflux disease)    Gestational diabetes mellitus (GDM) controlled on oral hypoglycemic drug (HCC)    Glyburide    Headache disorder    Treated with nurtec, hormonal and seasonal    Headache following lumbar puncture    Hemorrhoids    Hemorrhoidectomy 2015?    History of cardiac murmur    Resolved    History of tobacco use    Hyperlipidemia    Hypertriglyceridemia    Indigestion    Occassional    Infertility management    IVF to conceive twins - male factor - retrograde ejaculation    Kidney stones    Memory deficit    Migraine with aura    Improved by taking OCP continuously. Rare visual aura.     Pap smear for cervical cancer screening    Pap & HPV negative.     Paratubal cyst    Removal of large paratubal cysts laparoscopically     Pleuritic chest pain    Right chronic serous otitis media    Screening mammogram, encounter for    Benign. Consider supplemental screening    Subacute ethmoidal sinusitis    SVT (supraventricular tachycardia) (HCC)    2 episodes around age 23. Patient has had echocardiograms & states cardiology says was benign    Tobacco abuse    Transient right leg weakness    Transient weakness of  left leg    Vitamin B1 deficiency    Low vitamin B1    Vitamin D deficiency    Wears glasses      Past Surgical History:   Procedure Laterality Date    Adenoidectomy  1992      2005    Primary  - failure to progress      10/28/2009    RCS for twins    Colposcopy,bx cervix/endocerv curr  10/06/2020    CIN1, ECC negative. Dr. Divya Santana    D & c  2008    Done at time of laparoscopy     Hemorrhoidectomy  2014    single-quadrant hemorrhoidectomy with excision of perinal skin tag - for thrombosed external hemorrhoid    Hemorrhoidectomy,int/ext,simple  2015    Hernia surgery      \"triple hernia\" surgery    Insert intrauterine device  2022    Mirena IUD - sounds 9 cm. Dr. Divya Santana    Knee surgery Left     ACL repair    Laparoscopy procedure unlisted  2008    Laparoscopy, lysis of adhesions, bilateral resection of paratubal cysts, chromopertubation - Dr. Alex Rosado,       ,     Other surgical history      IVF    Removal of radial styloid  2002    Tonsillectomy      [place] teeth removed           Social History     Socioeconomic History    Marital status:    Tobacco Use    Smoking status: Former     Current packs/day: 0.00     Types: Cigarettes     Start date: 2017     Quit date: 2017     Years since quittin.7    Smokeless tobacco: Former   Vaping Use    Vaping status: Never Used   Substance and Sexual Activity    Alcohol use: Yes     Alcohol/week: 5.0 standard drinks of alcohol     Types: 5 Cans of beer per week     Comment: 2 drinks a week    Drug use: No    Sexual activity: Yes     Partners: Male     Birth control/protection: I.U.D., Mirena     Comment: Mirena IUD placed 22   Other Topics Concern    Caffeine Concern No    Stress Concern No    Weight Concern No    Special Diet No    Exercise Yes    Seat Belt Yes    Self-Exams No         REVIEW OF SYSTEMS:   GENERAL: no fever.   SKIN: no rashes or  abnormal skin lesions  HEENT: See HPI  LUNGS: denies shortness of breath or wheezing, See HPI  CARDIOVASCULAR: denies chest pain or palpitations   GI: denies N/V/C or abdominal pain  NEURO: Denies headache or weakness    EXAM:   /60   Pulse 90   Temp 98.2 °F (36.8 °C) (Temporal)   Wt 120 lb 12.8 oz (54.8 kg)   SpO2 98%   BMI 25.96 kg/m²   GENERAL: well developed, well nourished,in no apparent distress  SKIN: no rashes,no suspicious lesions  HEAD: atraumatic, normocephalic.  + tenderness on palpation of  sinuses  EYES: conjunctiva clear, EOM intact  EARS: TM's gray, no bulging, no retraction,+ fluid, bony landmarks visible  NOSE: Nostrils patent, + nasal discharge, nasal mucosa erythematous    THROAT: Oral mucosa pink, moist. Posterior pharynx is non erythematous. no exudates.   NECK: Supple, non-tender  LUNGS: clear to auscultation bilaterally, no wheezes or rhonchi. Breathing is non labored.  CARDIO: RRR without murmur  EXTREMITIES: no cyanosis, clubbing or edema  LYMPH:  no lymphadenopathy.    NEURO: A&O x 4. CN II-XII intact. Bilateral patellar reflexes 2+. [place] Hallpike maneuver negative.       ASSESSMENT AND PLAN:   Geetha Stallings is a 49 year old female who presents with upper respiratory symptoms that are consistent with    ASSESSMENT:   Encounter Diagnoses   Name Primary?    Acute non-recurrent pansinusitis Yes    Vertigo          PLAN:   Medication as below  Comfort care per pt instructions.   To follow up for any new or worsening symptoms or if not improving the next few days.   To go to the ER for any worsening or  severe symptoms such worsening vertigo/ dizziness, severe headache, weakness, or vision changes.   To follow up with pcp if not improving the next 2 days.     Meds & Refills for this Visit:  Requested Prescriptions     Signed Prescriptions Disp Refills    amoxicillin clavulanate 875-125 MG Oral Tab 14 tablet 0     Sig: Take 1 tablet by mouth 2 (two) times daily for 7 days.     fluticasone propionate 50 MCG/ACT Nasal Suspension 1 each 0     Si sprays by Each Nare route daily.    meclizine 25 MG Oral Tab 15 tablet 0     Sig: Take 1 tablet (25 mg total) by mouth 3 (three) times daily as needed for Dizziness.       Risks, benefits, and side effects of medication explained and discussed.    Patient Instructions   Medication as prescribed  ER for any worsening / severe symptoms such as worsening dizziness/vertigo, severe headache, vision changes, weakness.   Follow up with your primary care doctor if not improving the next 2 days.     The patient indicates understanding of these issues and agrees to the plan.  The patient is asked to return if sx's persist or worsen.

## 2025-03-07 ENCOUNTER — TELEPHONE (OUTPATIENT)
Dept: FAMILY MEDICINE CLINIC | Facility: CLINIC | Age: 50
End: 2025-03-07

## 2025-03-07 DIAGNOSIS — G43.809 OTHER MIGRAINE WITHOUT STATUS MIGRAINOSUS, NOT INTRACTABLE: ICD-10-CM

## 2025-03-07 NOTE — TELEPHONE ENCOUNTER
Received prior authorization from [facility] for Zepbound 2.5mg/0.5ml    [id number]    Put in nurses bin

## 2025-03-10 NOTE — TELEPHONE ENCOUNTER
Refill(s) Requested:   Requested Prescriptions     Pending Prescriptions Disp Refills    Rimegepant Sulfate (NURTEC) 75 MG Oral Tablet Dispersible [Pharmacy Med Name: NURTEC ODT 75 MG TABLET] 16 tablet 0     Sig: TAKE 1 TABLET BY MOUTH EVERY OTHER DAY     Medication Last Prescribed:  11/20/2023 16 tablet 5 refills     Has dose or medication been changed    since last prescription? *Review notes*    []  Yes       [x]  No     Last office visit: 7/22/2024 (in-office)  3/23/2024 (virtual visit)     Relevant details from LOV note: Other migraine without status migrainosus, not intractable  Off oral progesterone-mood and weight gain issues but migraines controlled  Uses Nurtec as abortive without any side effects  Hormonal, allergy, stress triggered  Improved off of norethindrone has Mirena IUD  Continue Nurtec as needed     Patient was asked to follow-up in: Return in about 1 year (around 7/22/2025) for annual physical, fasting labs AM, sooner if needed     Appointment due: July 2025    Future Appointments: Visit date not found     Action taken:  [x] Request routed to provider for review

## 2025-03-10 NOTE — TELEPHONE ENCOUNTER
Tried doing a PA via [employer] and Cover My Meds.  Can back as- CoverMyMeds Recommendation  This medication may be excluded from the patient's benefit. For more information, please reach out to [facility] directly at [phone].    Spoke with Pt. Pt has not been on meds consistently  and meds causing nausea. Pt scheduled an appt for 3/25/25 to discuss med and weight. Pt wants to hold off on PA

## 2025-03-13 RX ORDER — RIMEGEPANT SULFATE 75 MG/75MG
1 TABLET, ORALLY DISINTEGRATING ORAL EVERY OTHER DAY
Qty: 16 TABLET | Refills: 0 | Status: SHIPPED | OUTPATIENT
Start: 2025-03-13

## 2025-03-25 ENCOUNTER — OFFICE VISIT (OUTPATIENT)
Dept: FAMILY MEDICINE CLINIC | Facility: CLINIC | Age: 50
End: 2025-03-25
Payer: COMMERCIAL

## 2025-03-25 VITALS
SYSTOLIC BLOOD PRESSURE: 108 MMHG | BODY MASS INDEX: 26.97 KG/M2 | RESPIRATION RATE: 20 BRPM | WEIGHT: 125 LBS | HEART RATE: 90 BPM | OXYGEN SATURATION: 97 % | HEIGHT: 57 IN | TEMPERATURE: 98 F | DIASTOLIC BLOOD PRESSURE: 60 MMHG

## 2025-03-25 DIAGNOSIS — Z87.898 HISTORY OF PREDIABETES: ICD-10-CM

## 2025-03-25 DIAGNOSIS — Z30.09 FAMILY PLANNING COUNSELING: Primary | ICD-10-CM

## 2025-03-25 DIAGNOSIS — E66.3 OVERWEIGHT (BMI 25.0-29.9): ICD-10-CM

## 2025-03-25 DIAGNOSIS — E78.2 MIXED HYPERLIPIDEMIA: ICD-10-CM

## 2025-03-25 DIAGNOSIS — Z97.5 IUD (INTRAUTERINE DEVICE) IN PLACE: ICD-10-CM

## 2025-03-25 DIAGNOSIS — G43.809 OTHER MIGRAINE WITHOUT STATUS MIGRAINOSUS, NOT INTRACTABLE: ICD-10-CM

## 2025-03-25 PROCEDURE — 99214 OFFICE O/P EST MOD 30 MIN: CPT | Performed by: FAMILY MEDICINE

## 2025-03-25 NOTE — PROGRESS NOTES
CHIEF COMPLAINT:   Chief Complaint   Patient presents with    Medication Follow-Up         HPI:     Geetha Stallings is a 49 year old female with hx of hyperlipidemia, hx of prediabetes presents for discuss weight management. Previously on mounjaro/zepbound- no side effects. Gained some weight and wanted to restart zepbound.    Family planning- has IUD. Considering pregnancy.    The following individual(s) verbally consented to be recorded using ambient AI listening technology and understand that they can each withdraw their consent to this listening technology at any point by asking the clinician to turn off or pause the recording:    Patient name: Geetha Stallings      History of Present Illness  Geetha Stallings is a 49 year old female who presents for a follow-up visit.    She has a history of prediabetes and high cholesterol. Her BMI is 27, which is below the threshold for GLP-1 medications according to new guidelines per her insurance carrier-requires BMI of 35 for treatment.. She was previously on Zepbound 5 mg and did well with it.  Probably more of a Mediterranean diet trying to stay active exercise.    She is inquiring about gene site testing as she has not received an email copy of the report despite providing her personal email. She is seeking a copy for her records and to answer questions from her sister, as she and her sister are identical twins and have discussed genetic similarities.    She is considering family planning options with her high school boyfriend, whom she recently started dating again. She is contemplating the possibility of having another child but is uncertain about her fertility status due to her age and the presence of an IUD, which prevents her from having a period. She has a history of gestational diabetes and had a  with her previous pregnancies.    She has a history of migraines and is currently using Nurtec, which she has received and is managing well.    She also  has a history of vertigo, for which she keeps meclizine on hand, although she does not require an active prescription at this time.    Would like to have labs ordered and complete prior to her annual physical in July 2025        Wt Readings from Last 6 Encounters:   03/25/25 125 lb (56.7 kg)   10/16/24 120 lb 12.8 oz (54.8 kg)   07/22/24 124 lb 9.6 oz (56.5 kg)   12/18/23 115 lb (52.2 kg)   09/19/23 114 lb 12.8 oz (52.1 kg)   05/30/23 109 lb 6.4 oz (49.6 kg)       HISTORY:  Past Medical History:    Abdominal hernia    Bilat inguinal and umbilical repaired 2005    Allergic rhinitis    ASCUS with positive high risk HPV cervical    Bloating    Exacerbated with stress    Blurred vision    Body piercing    Bronchitis    Calculus of kidney    Chronic constipation    Constipation    Occasional    Dense breasts    Heterogeneously dense breasts, cat c on mammogram    Dysplasia of cervix, low grade (REN 1)    CIN1, negative ECC     Elevated fasting glucose    9/2021 & 9/23/22    Environmental allergies    External hemorrhoids    Eye muscle weakness, right    intermittent after closes eye for long periods    Flatulence/gas pain/belching    GERD (gastroesophageal reflux disease)    Gestational diabetes mellitus (GDM) controlled on oral hypoglycemic drug (HCC)    Glyburide    Headache disorder    Treated with nurtec, hormonal and seasonal    Headache following lumbar puncture    Hemorrhoids    Hemorrhoidectomy 2015?    History of cardiac murmur    Resolved    History of tobacco use    Hyperlipidemia    Hypertriglyceridemia    Indigestion    Occassional    Infertility management    IVF to conceive twins - male factor - retrograde ejaculation    Kidney stones    Memory deficit    Migraine with aura    Improved by taking OCP continuously. Rare visual aura.     Pap smear for cervical cancer screening    Pap & HPV negative.     Paratubal cyst    Removal of large paratubal cysts laparoscopically     Pleuritic chest pain    Right  chronic serous otitis media    Screening mammogram, encounter for    Benign. Consider supplemental screening    Subacute ethmoidal sinusitis    SVT (supraventricular tachycardia) (HCC)    2 episodes around age 23. Patient has had echocardiograms & states cardiology says was benign    Tobacco abuse    Transient right leg weakness    Transient weakness of left leg    Vitamin B1 deficiency    Low vitamin B1    Vitamin D deficiency    Wears glasses      Past Surgical History:   Procedure Laterality Date    Adenoidectomy  1992      2005    Primary  - failure to progress      10/28/2009    RCS for twins    Colposcopy,bx cervix/endocerv curr  10/06/2020    CIN1, ECC negative. Dr. Divya Santana    D & c  2008    Done at time of laparoscopy     Hemorrhoidectomy  2014    single-quadrant hemorrhoidectomy with excision of perinal skin tag - for thrombosed external hemorrhoid    Hemorrhoidectomy,int/ext,simple  2015    Hernia surgery      \"triple hernia\" surgery    Insert intrauterine device  2022    Mirena IUD - sounds 9 cm. Dr. Divya Santana    Knee surgery Left     ACL repair    Laparoscopy procedure unlisted  2008    Laparoscopy, lysis of adhesions, bilateral resection of paratubal cysts, chromopertubation - Dr. Alex Rosado,       ,     Other surgical history      IVF    Removal of radial styloid  2002    Tonsillectomy      [place] teeth removed        Family History   Adopted: Yes   Problem Relation Age of Onset    No Known Problems Sister     No Known Problems Son     No Known Problems Son     No Known Problems Daughter       Social History:   Social History     Socioeconomic History    Marital status:    Tobacco Use    Smoking status: Former     Current packs/day: 0.00     Types: Cigarettes     Start date: 2017     Quit date: 2017     Years since quittin.1     Passive exposure: Never    Smokeless tobacco: Former    Vaping Use    Vaping status: Never Used   Substance and Sexual Activity    Alcohol use: Yes     Alcohol/week: 5.0 standard drinks of alcohol     Comment: 2 drinks a week    Drug use: No    Sexual activity: Yes     Partners: Male     Birth control/protection: I.U.D., Mirena     Comment: Mirena IUD placed 9/26/22   Other Topics Concern    Caffeine Concern No    Stress Concern No    Weight Concern No    Special Diet No    Exercise Yes    Seat Belt Yes    Self-Exams No     Social Drivers of Health     Food Insecurity: No Food Insecurity (3/25/2025)    NCSS - Food Insecurity     Worried About Running Out of Food in the Last Year: No     Ran Out of Food in the Last Year: No   Transportation Needs: No Transportation Needs (3/25/2025)    NCSS - Transportation     Lack of Transportation: No   Housing Stability: Not At Risk (3/25/2025)    NCSS - Housing/Utilities     Has Housing: Yes     Worried About Losing Housing: No     Unable to Get Utilities: No        Medications (Active prior to today's visit):  Current Outpatient Medications   Medication Sig Dispense Refill    Rimegepant Sulfate (NURTEC) 75 MG Oral Tablet Dispersible Take 1 tablet by mouth every other day. 16 tablet 0    meclizine 25 MG Oral Tab Take 1 tablet (25 mg total) by mouth 3 (three) times daily as needed for Dizziness. 15 tablet 0    levocetirizine (XYZAL ALLERGY 24HR) 5 MG Oral Tab Take 1 tablet (5 mg total) by mouth every evening.      Levonorgestrel (MIRENA, 52 MG,) 20 MCG/DAY Intrauterine IUD 20 mcg (1 each total) by Intrauterine route one time. iud placement 09/26/2022  Esther Yuan      ondansetron 8 MG Oral Tablet Dispersible Take 1 tablet (8 mg total) by mouth every 8 (eight) hours as needed for Nausea. 30 tablet 0    FLUTICASONE PROPIONATE 50 MCG/ACT Nasal Suspension USE 1 SPRAY INTO EACH NOSTRIL TWICE A DAY 48 mL 3    Tirzepatide-Weight Management (ZEPBOUND) 5 MG/0.5ML Subcutaneous Solution Auto-injector Inject 5 mg into the skin once a  week. 2 mL 1       Allergies:  Allergies[1]    PSFH elements reviewed from today and agreed except as otherwise stated in HPI.  PHYSICAL EXAM:   /60   Pulse 90   Temp 97.5 °F (36.4 °C) (Temporal)   Resp 20   Ht 4' 9\" (1.448 m)   Wt 125 lb (56.7 kg)   SpO2 97%   BMI 27.05 kg/m²   Vital signs reviewed.Appears stated age, well groomed.  Physical Exam   GENERAL: well developed, well nourished,in no apparent distress  EYES; PERRLA, EOM-i B/L. Sclera clear and non icteric bilateral  SKIN: no rashes,no suspicious lesions  HEENT: atraumatic, normocephalic  NECK: supple,no adenopathy,no bruits  LUNGS: clear to auscultation bilateral. No RRW. Good inspiratory and expiratory effort  CARDIO: RRR without murmur, clear S1, S2  GI: good BS's,no masses,No HSM or tenderness.   EXTREMITIES: no cyanosis, clubbing or edema, bilateral. No calf tenderness    LABS     No visits with results within 2 Month(s) from this visit.   Latest known visit with results is:   Office Visit on 07/22/2024   Component Date Value    Pregnancy Test, Urine 07/22/2024 negative     Control Line Present wit* 07/22/2024 yes     Kit Lot # 07/22/2024 [id number]     Kit Expiration Date 07/22/2024 01/11/2025       REVIEWED THIS VISIT  ASSESSMENT/PLAN:   49 year old female with    1. Family planning counseling    2. IUD (intrauterine device) in place    3. Overweight (BMI 25.0-29.9)  - Comp Metabolic Panel; Future  - Hemoglobin A1C; Future  - TSH W Reflex To Free T4; Future    4. Mixed hyperlipidemia  - CBC With Differential With Platelet; Future  - Comp Metabolic Panel; Future  - Lipid Panel; Future    5. History of prediabetes  - Comp Metabolic Panel; Future  - Hemoglobin A1C; Future    6. Other migraine without status migrainosus, not intractable    The patient and provider have a longitudinal relationship to address/treat the serious or complex condition as stated in this encounter.    Assessment & Plan  Weight management    She has prediabetes and  hyperlipidemia. Her BMI is 27, which does not meet the criteria for GLP-1 medication prescription, requiring a BMI of 35 or higher. She previously used Zepbound successfully, but current guidelines restrict its use for weight loss. Alternative options are available through a weight loss clinic.    -Encouraged low-carb Mediterranean diet and regular exercise and 53 minutes weekly  - Refer to weight loss clinic for alternative weight management options -patient declined    Family planning    At 49, she is considering pregnancy with her partner. She currently has an IUD, which obscures her menstrual status. She is aware of the increased risks of advanced maternal age, including Down syndrome, preeclampsia, gestational diabetes and hypertension, and potential fertility treatments. She has a history of gestational diabetes and previous C-sections.  Would need repeat .  Discussions with her partner include the implications of having a child at their age, lifestyle impact, and age gap with existing children.    - Discuss with partner the implications and risks of having a child at advanced maternal age.    - Consider removal of IUD if decision to pursue pregnancy is made.    - Evaluate fertility status if IUD is removed.    Migraine management    She manages migraines with Nurtec as needed, with no current issues.    - Continue Nurtec as prescribed.    Vertigo    She experiences infrequent vertigo episodes every 5-10 years and uses meclizine as needed.    - Use meclizine as needed for vertigo episodes.    General Health Maintenance    She is due for a physical examination in July. She had ASCUS on a Pap smear in , but the most recent Pap smear in 2023 was normal. She manages healthcare costs using the [employer].    - Schedule physical examination in July.    - Continue using [employer] for healthcare services.        Meds This Visit:  Requested Prescriptions      No prescriptions  requested or ordered in this encounter       Health Maintenance:  Health Maintenance   Topic Date Due    COVID-19 Vaccine (4 - 2024-25 season) 09/01/2024    Annual Physical  07/22/2025    Mammogram  07/22/2025    Pap Smear  09/19/2026    DTaP,Tdap,and Td Vaccines (4 - Td or Tdap) 09/20/2032    Colorectal Cancer Screening  07/08/2034    Influenza Vaccine  Completed    Annual Depression Screening  Completed    Pneumococcal Vaccine: Birth to 50yrs  Aged Out    Meningococcal B Vaccine  Aged Out         Patient/Caregiver Education: Patient/Caregiver Education: There are no barriers to learning. Medical education done.   Outcome: Patient verbalizes understanding. Patient is notified to call with any questions, comp lications, allergies, or worsening or changing symptoms.  Patient is to call with any side effects or complications from the treatments as a result of today.     Problem List:     Patient Active Problem List   Diagnosis    External hemorrhoids    GERD (gastroesophageal reflux disease)    Headache following lumbar puncture    Migraine    Vitamin D deficiency    Environmental allergies    Mixed hyperlipidemia    ASCUS with positive high risk HPV cervical    History of PSVT (paroxysmal supraventricular tachycardia)    Overweight (BMI 25.0-29.9)    Dense breasts    Breakthrough bleeding on birth control pills    Chronic constipation    Encounter for insertion of intrauterine contraceptive device (IUD)    Elevated fasting glucose    Metabolic syndrome    History of prediabetes    BMI 24.0-24.9, adult    History of anxiety    History of obesity    Attention deficit    Special screening for malignant neoplasm of colon    Hip strain, left, initial encounter    IUD (intrauterine device) in place       Imaging & Referrals:  None     3/25/2025  Kelly Velasco, DO      Patient understands plan and follow-up.  Return in about 4 months (around 7/25/2025) for annual physical, sooner if needed., fasting labs AM.            [1]    Allergies  Allergen Reactions    Prednisone MYALGIA    Sumatriptan OTHER (SEE COMMENTS)     Chest pain     Bupropion RASH     hives    Cefdinir RASH     Headache

## 2025-07-14 ENCOUNTER — TELEPHONE (OUTPATIENT)
Dept: FAMILY MEDICINE CLINIC | Facility: CLINIC | Age: 50
End: 2025-07-14

## 2025-07-14 DIAGNOSIS — G43.809 OTHER MIGRAINE WITHOUT STATUS MIGRAINOSUS, NOT INTRACTABLE: ICD-10-CM

## 2025-07-14 RX ORDER — RIMEGEPANT SULFATE 75 MG/75MG
1 TABLET, ORALLY DISINTEGRATING ORAL EVERY OTHER DAY
Qty: 16 TABLET | Refills: 0 | Status: SHIPPED | OUTPATIENT
Start: 2025-07-14

## 2025-07-14 RX ORDER — RIMEGEPANT SULFATE 75 MG/75MG
75 TABLET, ORALLY DISINTEGRATING ORAL DAILY PRN
Qty: 2 TABLET | Refills: 0 | COMMUNITY
Start: 2025-07-14

## 2025-07-14 NOTE — PROGRESS NOTES
Date given: 7-  Lot #: [id number]  NDC #: N/A  Exp. date:   Qty: 2 boxes   : N/a    Nurtec 75mg

## 2025-07-15 NOTE — TELEPHONE ENCOUNTER
Approved    Prior authorization approved  Payer: [employer] Case ID: [id number]    [phone]    [phone]  Note from payer: CaseId:[id number];Status:Approved;Review Type:Prior Auth;Coverage Start Date:06/14/2025;Coverage End Date:07/14/2026;  Approval Details    Authorized from June 14, 2025 to July 14, 2026

## (undated) NOTE — Clinical Note
Ryan Stokes,  Our mutual patient states she does not need a Pap. 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 Can I change the interval to 3 years? Patient had her well woman 8/2022.   Thank you,  Wilian Álvarez

## (undated) NOTE — LETTER
Date & Time: 9/4/2019, 12:40 PM  Patient: Alma Ruiz  Encounter Provider(s):    Lety Gannon       To Whom It May Concern:    Constantino Farmer was seen and treated in our department on 9/4/2019. She should not return to work until 9/5/19.

## (undated) NOTE — LETTER
AUTHORIZATION FOR SURGICAL OPERATION OR OTHER PROCEDURE    1.  I hereby [facility] Brenda Espinoza [facility] staff assigned to my case to perform the following operation and/or procedure at the [facility]:    ___________________________________ AD  P.M.        Patient Name:  ______________________________________________________  (please print)      Patient signature:  ___________________________________________________             Relationship to Patient:           []  Parent    Responsible per

## (undated) NOTE — LETTER
Luda Amato, :1975    CONSENT FOR PROCEDURE/SEDATION    1. I authorize the performance upon Luda Amato  the following: Colposcopy    2.  I authorize Dr. Flakito Waggoner MD (and whomever is designated as the doctor’s assistant), to pe Witness: _________________________________________ Date:___________     Physician Signature: _______________________________ Date:___________

## (undated) NOTE — LETTER
09/24/19        [address] [address]      Dear Lennox Alfaro,    [facility] records indicate that you have outstanding lab work and or testing that was ordered for you and has not yet been completed:  Orders Placed This Encounter

## (undated) NOTE — ED AVS SNAPSHOT
Christine Holtjennifer   MRN: [id number]    Department:  [facility] Emergency Department   Date of Visit:  12/26/2018           Disclosure     Insurance plans vary and the physician(s) referred by the ER may not be covered by your plan.  Please contact tell this physician (or your personal doctor if your instructions are to return to your personal doctor) about any new or lasting problems. The primary care or specialist physician will see patients referred from the [facility] Emergency Department.  Teresa Herrmann

## (undated) NOTE — LETTER
[facility] [place] [facility] [place]  After Care Instructions for Colposcopy/Biopsy      Biopsy Results   You will receive a phone call with your biopsy results in 7 business days. If you have not received your biopsy results in 7 days, please contact our office.   Juvenal White

## (undated) NOTE — LETTER
AUTHORIZATION FOR SURGICAL OPERATION OR OTHER PROCEDURE    1.  I hereby authorize Dr. Lupe Paul, and [facility] staff assigned to my case to perform the following operation and/or procedure at the [facility]:    _Cortisone injection right elbow Patient Name:  ______________________________________________________  (please print)      Patient signature:  ___________________________________________________             Relationship to Patient:           []  Parent    Responsible person